# Patient Record
Sex: FEMALE | Race: WHITE | NOT HISPANIC OR LATINO | Employment: UNEMPLOYED | URBAN - METROPOLITAN AREA
[De-identification: names, ages, dates, MRNs, and addresses within clinical notes are randomized per-mention and may not be internally consistent; named-entity substitution may affect disease eponyms.]

---

## 2017-01-01 ENCOUNTER — APPOINTMENT (OUTPATIENT)
Dept: CARDIOLOGY | Facility: HOSPITAL | Age: 82
End: 2017-01-01
Attending: HOSPITALIST

## 2017-01-01 ENCOUNTER — APPOINTMENT (OUTPATIENT)
Dept: GENERAL RADIOLOGY | Facility: HOSPITAL | Age: 82
End: 2017-01-01

## 2017-01-01 ENCOUNTER — APPOINTMENT (OUTPATIENT)
Dept: CT IMAGING | Facility: HOSPITAL | Age: 82
End: 2017-01-01

## 2017-01-01 ENCOUNTER — HOSPITAL ENCOUNTER (INPATIENT)
Facility: HOSPITAL | Age: 82
LOS: 2 days | End: 2017-03-27
Attending: EMERGENCY MEDICINE | Admitting: HOSPITALIST

## 2017-01-01 VITALS
TEMPERATURE: 100.9 F | OXYGEN SATURATION: 96 % | HEART RATE: 55 BPM | DIASTOLIC BLOOD PRESSURE: 53 MMHG | BODY MASS INDEX: 21.27 KG/M2 | HEIGHT: 64 IN | RESPIRATION RATE: 28 BRPM | WEIGHT: 124.56 LBS | SYSTOLIC BLOOD PRESSURE: 103 MMHG

## 2017-01-01 DIAGNOSIS — R42 DIZZINESS: ICD-10-CM

## 2017-01-01 DIAGNOSIS — N17.9 ACUTE ON CHRONIC RENAL FAILURE (HCC): ICD-10-CM

## 2017-01-01 DIAGNOSIS — Z78.9 IMPAIRED MOBILITY AND ADLS: ICD-10-CM

## 2017-01-01 DIAGNOSIS — Z74.09 IMPAIRED FUNCTIONAL MOBILITY, BALANCE, GAIT, AND ENDURANCE: ICD-10-CM

## 2017-01-01 DIAGNOSIS — Z74.09 IMPAIRED MOBILITY AND ADLS: ICD-10-CM

## 2017-01-01 DIAGNOSIS — N18.9 ACUTE ON CHRONIC RENAL FAILURE (HCC): ICD-10-CM

## 2017-01-01 DIAGNOSIS — R53.1 WEAKNESS: ICD-10-CM

## 2017-01-01 DIAGNOSIS — E86.0 DEHYDRATION, MODERATE: Primary | ICD-10-CM

## 2017-01-01 LAB
ABO GROUP BLD: NORMAL
ABO GROUP BLD: NORMAL
ALBUMIN SERPL-MCNC: 3.1 G/DL (ref 3.2–4.8)
ALBUMIN SERPL-MCNC: 3.6 G/DL (ref 3.2–4.8)
ALBUMIN/GLOB SERPL: 1.5 G/DL (ref 1.5–2.5)
ALBUMIN/GLOB SERPL: 1.6 G/DL (ref 1.5–2.5)
ALP SERPL-CCNC: 155 U/L (ref 25–100)
ALP SERPL-CCNC: 162 U/L (ref 25–100)
ALT SERPL W P-5'-P-CCNC: 15 U/L (ref 7–40)
ALT SERPL W P-5'-P-CCNC: 18 U/L (ref 7–40)
AMORPH URATE CRY URNS QL MICRO: ABNORMAL /HPF
ANION GAP SERPL CALCULATED.3IONS-SCNC: 10 MMOL/L (ref 3–11)
ANION GAP SERPL CALCULATED.3IONS-SCNC: 10 MMOL/L (ref 3–11)
ANION GAP SERPL CALCULATED.3IONS-SCNC: 13 MMOL/L (ref 3–11)
ANION GAP SERPL CALCULATED.3IONS-SCNC: 16 MMOL/L (ref 3–11)
APTT PPP: 26.4 SECONDS (ref 24–31)
ARTERIAL PATENCY WRIST A: ABNORMAL
AST SERPL-CCNC: 106 U/L (ref 0–33)
AST SERPL-CCNC: 90 U/L (ref 0–33)
ATMOSPHERIC PRESS: ABNORMAL MMHG
BACTERIA SPEC AEROBE CULT: NORMAL
BACTERIA UR QL AUTO: ABNORMAL /HPF
BASE EXCESS BLDA CALC-SCNC: <-20 MMOL/L (ref 0–2)
BASOPHILS # BLD MANUAL: 0 10*3/MM3 (ref 0–0.2)
BASOPHILS # BLD MANUAL: 0.1 10*3/MM3 (ref 0–0.2)
BASOPHILS NFR BLD AUTO: 0 % (ref 0–1)
BASOPHILS NFR BLD AUTO: 1 % (ref 0–1)
BDY SITE: ABNORMAL
BH CV ECHO MEAS - AI DEC SLOPE: 226.6 CM/SEC^2
BH CV ECHO MEAS - AI MAX PG: 48.6 MMHG
BH CV ECHO MEAS - AI MAX VEL: 348.6 CM/SEC
BH CV ECHO MEAS - AI P1/2T: 450.7 MSEC
BH CV ECHO MEAS - AO MAX PG (FULL): 0.47 MMHG
BH CV ECHO MEAS - AO MAX PG: 7 MMHG
BH CV ECHO MEAS - AO ROOT AREA (BSA CORRECTED): 1.6
BH CV ECHO MEAS - AO ROOT AREA: 5.1 CM^2
BH CV ECHO MEAS - AO ROOT DIAM: 2.5 CM
BH CV ECHO MEAS - AO V2 MAX: 132.7 CM/SEC
BH CV ECHO MEAS - AVA(V,A): 2.7 CM^2
BH CV ECHO MEAS - AVA(V,D): 2.7 CM^2
BH CV ECHO MEAS - BSA(HAYCOCK): 1.6 M^2
BH CV ECHO MEAS - BSA: 1.6 M^2
BH CV ECHO MEAS - BZI_BMI: 21.3 KILOGRAMS/M^2
BH CV ECHO MEAS - BZI_METRIC_HEIGHT: 162.6 CM
BH CV ECHO MEAS - BZI_METRIC_WEIGHT: 56.2 KG
BH CV ECHO MEAS - EDV(CUBED): 31 ML
BH CV ECHO MEAS - EDV(TEICH): 39.1 ML
BH CV ECHO MEAS - EF(CUBED): 77.8 %
BH CV ECHO MEAS - EF(TEICH): 71.4 %
BH CV ECHO MEAS - ESV(CUBED): 6.9 ML
BH CV ECHO MEAS - ESV(TEICH): 11.2 ML
BH CV ECHO MEAS - FS: 39.4 %
BH CV ECHO MEAS - IVS/LVPW: 0.98
BH CV ECHO MEAS - IVSD: 0.98 CM
BH CV ECHO MEAS - LA DIMENSION: 3.4 CM
BH CV ECHO MEAS - LA/AO: 1.3
BH CV ECHO MEAS - LAT PEAK E' VEL: 11.5 CM/SEC
BH CV ECHO MEAS - LV MASS(C)D: 86.8 GRAMS
BH CV ECHO MEAS - LV MASS(C)DI: 54.4 GRAMS/M^2
BH CV ECHO MEAS - LV MAX PG: 6.6 MMHG
BH CV ECHO MEAS - LV MEAN PG: 3.4 MMHG
BH CV ECHO MEAS - LV V1 MAX: 128.2 CM/SEC
BH CV ECHO MEAS - LV V1 MEAN: 85.1 CM/SEC
BH CV ECHO MEAS - LV V1 VTI: 19.2 CM
BH CV ECHO MEAS - LVIDD: 3.1 CM
BH CV ECHO MEAS - LVIDS: 1.9 CM
BH CV ECHO MEAS - LVOT AREA (M): 2.8 CM^2
BH CV ECHO MEAS - LVOT AREA: 2.8 CM^2
BH CV ECHO MEAS - LVOT DIAM: 1.9 CM
BH CV ECHO MEAS - LVPWD: 1 CM
BH CV ECHO MEAS - MED PEAK E' VEL: 7.7 CM/SEC
BH CV ECHO MEAS - MV E MAX VEL: 146 CM/SEC
BH CV ECHO MEAS - PA ACC SLOPE: 965.8 CM/SEC^2
BH CV ECHO MEAS - PA ACC TIME: 0.1 SEC
BH CV ECHO MEAS - PA MAX PG: 4.7 MMHG
BH CV ECHO MEAS - PA PR(ACCEL): 33.8 MMHG
BH CV ECHO MEAS - PA V2 MAX: 108.6 CM/SEC
BH CV ECHO MEAS - RAP SYSTOLE: 8 MMHG
BH CV ECHO MEAS - RVDD: 2.1 CM
BH CV ECHO MEAS - RVSP: 52 MMHG
BH CV ECHO MEAS - SI(CUBED): 15.1 ML/M^2
BH CV ECHO MEAS - SI(LVOT): 34 ML/M^2
BH CV ECHO MEAS - SI(TEICH): 17.5 ML/M^2
BH CV ECHO MEAS - SV(CUBED): 24.1 ML
BH CV ECHO MEAS - SV(LVOT): 54.4 ML
BH CV ECHO MEAS - SV(TEICH): 27.9 ML
BH CV ECHO MEAS - TAPSE (>1.6): 1 CM2
BH CV ECHO MEAS - TR MAX VEL: 333 CM/SEC
BH CV LOWER VASCULAR LEFT COMMON FEMORAL AUGMENT: NORMAL
BH CV LOWER VASCULAR LEFT COMMON FEMORAL COMPRESS: NORMAL
BH CV LOWER VASCULAR LEFT COMMON FEMORAL PHASIC: NORMAL
BH CV LOWER VASCULAR LEFT COMMON FEMORAL SPONT: NORMAL
BH CV LOWER VASCULAR LEFT DISTAL FEMORAL COMPRESS: NORMAL
BH CV LOWER VASCULAR LEFT GASTRONEMIUS COMPRESS: NORMAL
BH CV LOWER VASCULAR LEFT GREATER SAPH AK COMPRESS: NORMAL
BH CV LOWER VASCULAR LEFT GREATER SAPH BK COMPRESS: NORMAL
BH CV LOWER VASCULAR LEFT LESSER SAPH COMPRESS: NORMAL
BH CV LOWER VASCULAR LEFT MID FEMORAL AUGMENT: NORMAL
BH CV LOWER VASCULAR LEFT MID FEMORAL COMPRESS: NORMAL
BH CV LOWER VASCULAR LEFT MID FEMORAL PHASIC: NORMAL
BH CV LOWER VASCULAR LEFT MID FEMORAL SPONT: NORMAL
BH CV LOWER VASCULAR LEFT PERONEAL COMPRESS: NORMAL
BH CV LOWER VASCULAR LEFT POPLITEAL AUGMENT: NORMAL
BH CV LOWER VASCULAR LEFT POPLITEAL COMPETENT: NORMAL
BH CV LOWER VASCULAR LEFT POPLITEAL COMPRESS: NORMAL
BH CV LOWER VASCULAR LEFT POPLITEAL PHASIC: NORMAL
BH CV LOWER VASCULAR LEFT POPLITEAL SPONT: NORMAL
BH CV LOWER VASCULAR LEFT POSTERIOR TIBIAL COMPRESS: NORMAL
BH CV LOWER VASCULAR LEFT PROXIMAL FEMORAL COMPRESS: NORMAL
BH CV LOWER VASCULAR LEFT SAPHENOFEMORAL JUNCTION AUGMENT: NORMAL
BH CV LOWER VASCULAR LEFT SAPHENOFEMORAL JUNCTION COMPRESS: NORMAL
BH CV LOWER VASCULAR LEFT SAPHENOFEMORAL JUNCTION PHASIC: NORMAL
BH CV LOWER VASCULAR LEFT SAPHENOFEMORAL JUNCTION SPONT: NORMAL
BH CV LOWER VASCULAR RIGHT COMMON FEMORAL AUGMENT: NORMAL
BH CV LOWER VASCULAR RIGHT COMMON FEMORAL COMPRESS: NORMAL
BH CV LOWER VASCULAR RIGHT COMMON FEMORAL PHASIC: NORMAL
BH CV LOWER VASCULAR RIGHT COMMON FEMORAL SPONT: NORMAL
BH CV UPPER VENOUS LEFT AXILLARY AUGMENT: NORMAL
BH CV UPPER VENOUS LEFT AXILLARY COMPRESS: NORMAL
BH CV UPPER VENOUS LEFT AXILLARY PHASIC: NORMAL
BH CV UPPER VENOUS LEFT AXILLARY SPONT: NORMAL
BH CV UPPER VENOUS LEFT BASILIC FOREARM COMPRESS: NORMAL
BH CV UPPER VENOUS LEFT BASILIC UPPER COMPRESS: NORMAL
BH CV UPPER VENOUS LEFT BRACHIAL COMPRESS: NORMAL
BH CV UPPER VENOUS LEFT CEPHALIC FOREARM COMPRESS: NORMAL
BH CV UPPER VENOUS LEFT CEPHALIC UPPER COMPRESS: NORMAL
BH CV UPPER VENOUS LEFT INTERNAL JUGULAR AUGMENT: NORMAL
BH CV UPPER VENOUS LEFT INTERNAL JUGULAR COMPRESS: NORMAL
BH CV UPPER VENOUS LEFT INTERNAL JUGULAR PHASIC: NORMAL
BH CV UPPER VENOUS LEFT INTERNAL JUGULAR SPONT: NORMAL
BH CV UPPER VENOUS LEFT SUBCLAVIAN AUGMENT: NORMAL
BH CV UPPER VENOUS LEFT SUBCLAVIAN COMPRESS: NORMAL
BH CV UPPER VENOUS LEFT SUBCLAVIAN PHASIC: NORMAL
BH CV UPPER VENOUS LEFT SUBCLAVIAN SPONT: NORMAL
BH CV UPPER VENOUS RIGHT SUBCLAVIAN AUGMENT: NORMAL
BH CV UPPER VENOUS RIGHT SUBCLAVIAN COMPRESS: NORMAL
BH CV UPPER VENOUS RIGHT SUBCLAVIAN PHASIC: NORMAL
BH CV UPPER VENOUS RIGHT SUBCLAVIAN SPONT: NORMAL
BH CV XLRA - RV BASE: 3.8 CM
BH CV XLRA - RV LENGTH: 5.1 CM
BH CV XLRA - RV MID: 2.8 CM
BH CV XLRA - TDI S': 12.1 CM/SEC
BILIRUB SERPL-MCNC: 0.5 MG/DL (ref 0.3–1.2)
BILIRUB SERPL-MCNC: 0.6 MG/DL (ref 0.3–1.2)
BILIRUB UR QL STRIP: NEGATIVE
BLASTS NFR BLD MANUAL: 4 % (ref 0–0)
BLD GP AB SCN SERPL QL: NEGATIVE
BNP SERPL-MCNC: 331 PG/ML (ref 0–100)
BUN BLD-MCNC: 62 MG/DL (ref 9–23)
BUN BLD-MCNC: 64 MG/DL (ref 9–23)
BUN BLD-MCNC: 72 MG/DL (ref 9–23)
BUN BLD-MCNC: 75 MG/DL (ref 9–23)
BUN/CREAT SERPL: 20.6 (ref 7–25)
BUN/CREAT SERPL: 20.7 (ref 7–25)
BUN/CREAT SERPL: 21.8 (ref 7–25)
BUN/CREAT SERPL: 24.2 (ref 7–25)
CALCIUM SPEC-SCNC: 7.7 MG/DL (ref 8.7–10.4)
CALCIUM SPEC-SCNC: 8 MG/DL (ref 8.7–10.4)
CALCIUM SPEC-SCNC: 8.2 MG/DL (ref 8.7–10.4)
CALCIUM SPEC-SCNC: 8.9 MG/DL (ref 8.7–10.4)
CHLORIDE SERPL-SCNC: 100 MMOL/L (ref 99–109)
CHLORIDE SERPL-SCNC: 100 MMOL/L (ref 99–109)
CHLORIDE SERPL-SCNC: 107 MMOL/L (ref 99–109)
CHLORIDE SERPL-SCNC: 97 MMOL/L (ref 99–109)
CLARITY UR: ABNORMAL
CO2 BLDA-SCNC: 7 MMOL/L (ref 22–33)
CO2 SERPL-SCNC: 16 MMOL/L (ref 20–31)
CO2 SERPL-SCNC: 17 MMOL/L (ref 20–31)
CO2 SERPL-SCNC: 22 MMOL/L (ref 20–31)
CO2 SERPL-SCNC: 25 MMOL/L (ref 20–31)
COHGB MFR BLD: 0.8 % (ref 0–2)
COLOR UR: YELLOW
CREAT BLD-MCNC: 3 MG/DL (ref 0.6–1.3)
CREAT BLD-MCNC: 3.1 MG/DL (ref 0.6–1.3)
CREAT BLD-MCNC: 3.1 MG/DL (ref 0.6–1.3)
CREAT BLD-MCNC: 3.3 MG/DL (ref 0.6–1.3)
CREAT UR-MCNC: 90.2 MG/DL
CYTOLOGIST CVX/VAG CYTO: NORMAL
D-LACTATE SERPL-SCNC: 11.2 MMOL/L (ref 0.5–2)
DEPRECATED RDW RBC AUTO: 46.6 FL (ref 37–54)
DEPRECATED RDW RBC AUTO: 47.5 FL (ref 37–54)
DEPRECATED RDW RBC AUTO: 48.5 FL (ref 37–54)
E/E' RATIO: 15
EOSINOPHIL # BLD MANUAL: 0.11 10*3/MM3 (ref 0.1–0.3)
EOSINOPHIL # BLD MANUAL: 0.21 10*3/MM3 (ref 0.1–0.3)
EOSINOPHIL NFR BLD MANUAL: 1 % (ref 0–3)
EOSINOPHIL NFR BLD MANUAL: 2 % (ref 0–3)
ERYTHROCYTE [DISTWIDTH] IN BLOOD BY AUTOMATED COUNT: 14.7 % (ref 11.3–14.5)
ERYTHROCYTE [DISTWIDTH] IN BLOOD BY AUTOMATED COUNT: 15.1 % (ref 11.3–14.5)
ERYTHROCYTE [DISTWIDTH] IN BLOOD BY AUTOMATED COUNT: 15.2 % (ref 11.3–14.5)
FERRITIN SERPL-MCNC: 1996 NG/ML (ref 10–291)
FLUAV AG NPH QL: NEGATIVE
FLUBV AG NPH QL IA: NEGATIVE
FOLATE SERPL-MCNC: 4.37 NG/ML (ref 3.2–20)
GFR SERPL CREATININE-BSD FRML MDRD: 13 ML/MIN/1.73
GFR SERPL CREATININE-BSD FRML MDRD: 14 ML/MIN/1.73
GFR SERPL CREATININE-BSD FRML MDRD: 14 ML/MIN/1.73
GFR SERPL CREATININE-BSD FRML MDRD: 15 ML/MIN/1.73
GGT SERPL-CCNC: 67 U/L (ref 0–37)
GLOBULIN UR ELPH-MCNC: 2.1 GM/DL
GLOBULIN UR ELPH-MCNC: 2.3 GM/DL
GLUCOSE BLD-MCNC: 85 MG/DL (ref 70–100)
GLUCOSE BLD-MCNC: 86 MG/DL (ref 70–100)
GLUCOSE BLD-MCNC: 86 MG/DL (ref 70–100)
GLUCOSE BLD-MCNC: 98 MG/DL (ref 70–100)
GLUCOSE UR STRIP-MCNC: NEGATIVE MG/DL
HCO3 BLDA-SCNC: 6.4 MMOL/L (ref 20–26)
HCT VFR BLD AUTO: 25 % (ref 34.5–44)
HCT VFR BLD AUTO: 26.9 % (ref 34.5–44)
HCT VFR BLD AUTO: 27.5 % (ref 34.5–44)
HCT VFR BLD CALC: 26.7 %
HGB BLD-MCNC: 8.5 G/DL (ref 11.5–15.5)
HGB BLD-MCNC: 8.9 G/DL (ref 11.5–15.5)
HGB BLD-MCNC: 9.6 G/DL (ref 11.5–15.5)
HGB BLDA-MCNC: 8.7 G/DL (ref 14–18)
HGB UR QL STRIP.AUTO: NEGATIVE
HOROWITZ INDEX BLD+IHG-RTO: 36 %
HYALINE CASTS UR QL AUTO: ABNORMAL /LPF
INR PPP: 1.1
IRON 24H UR-MRATE: 97 MCG/DL (ref 50–175)
IRON SATN MFR SERPL: 45 % (ref 15–50)
KETONES UR QL STRIP: NEGATIVE
LEFT ATRIUM VOLUME INDEX: 36 ML/M2
LEFT ATRIUM VOLUME: 58 CM3
LEUKOCYTE ESTERASE UR QL STRIP.AUTO: NEGATIVE
LIPASE SERPL-CCNC: 31 U/L (ref 6–51)
LV EF 2D ECHO EST: 80 %
LYMPHOCYTES # BLD MANUAL: 2.02 10*3/MM3 (ref 0.6–4.8)
LYMPHOCYTES # BLD MANUAL: 3.31 10*3/MM3 (ref 0.6–4.8)
LYMPHOCYTES NFR BLD MANUAL: 1 % (ref 0–12)
LYMPHOCYTES NFR BLD MANUAL: 11 % (ref 0–12)
LYMPHOCYTES NFR BLD MANUAL: 18 % (ref 24–44)
LYMPHOCYTES NFR BLD MANUAL: 32 % (ref 24–44)
MAGNESIUM SERPL-MCNC: 2.6 MG/DL (ref 1.3–2.7)
MAGNESIUM SERPL-MCNC: 2.6 MG/DL (ref 1.3–2.7)
MCH RBC QN AUTO: 28.8 PG (ref 27–31)
MCH RBC QN AUTO: 29.2 PG (ref 27–31)
MCH RBC QN AUTO: 30.1 PG (ref 27–31)
MCHC RBC AUTO-ENTMCNC: 33.1 G/DL (ref 32–36)
MCHC RBC AUTO-ENTMCNC: 34 G/DL (ref 32–36)
MCHC RBC AUTO-ENTMCNC: 34.9 G/DL (ref 32–36)
MCV RBC AUTO: 84.7 FL (ref 80–99)
MCV RBC AUTO: 86.2 FL (ref 80–99)
MCV RBC AUTO: 88.2 FL (ref 80–99)
METAMYELOCYTES NFR BLD MANUAL: 10 % (ref 0–0)
METAMYELOCYTES NFR BLD MANUAL: 2 % (ref 0–0)
METHGB BLD QL: 1.1 % (ref 0–1.5)
MODALITY: ABNORMAL
MONOCYTES # BLD AUTO: 0.11 10*3/MM3 (ref 0–1)
MONOCYTES # BLD AUTO: 1.14 10*3/MM3 (ref 0–1)
MYELOCYTES NFR BLD MANUAL: 25 % (ref 0–0)
NEUTROPHILS # BLD AUTO: 3.41 10*3/MM3 (ref 1.5–8.3)
NEUTROPHILS # BLD AUTO: 4.26 10*3/MM3 (ref 1.5–8.3)
NEUTROPHILS NFR BLD MANUAL: 16 % (ref 41–71)
NEUTROPHILS NFR BLD MANUAL: 31 % (ref 41–71)
NEUTS BAND NFR BLD MANUAL: 17 % (ref 0–5)
NEUTS BAND NFR BLD MANUAL: 7 % (ref 0–5)
NITRITE UR QL STRIP: NEGATIVE
OXYHGB MFR BLDV: 86.1 % (ref 94–99)
PATH INTERP BLD-IMP: NORMAL
PCO2 BLDA: 19.3 MM HG (ref 35–45)
PH BLDA: 7.13 PH UNITS (ref 7.35–7.45)
PH UR STRIP.AUTO: 5.5 [PH] (ref 5–8)
PHOSPHATE SERPL-MCNC: 2.6 MG/DL (ref 2.4–5.1)
PHOSPHATE SERPL-MCNC: 3.3 MG/DL (ref 2.4–5.1)
PLAT MORPH BLD: NORMAL
PLAT MORPH BLD: NORMAL
PLATELET # BLD AUTO: 55 10*3/MM3 (ref 150–450)
PLATELET # BLD AUTO: 70 10*3/MM3 (ref 150–450)
PLATELET # BLD AUTO: 71 10*3/MM3 (ref 150–450)
PMV BLD AUTO: 8.9 FL (ref 6–12)
PMV BLD AUTO: 8.9 FL (ref 6–12)
PMV BLD AUTO: 9.2 FL (ref 6–12)
PO2 BLDA: 66.6 MM HG (ref 83–108)
POTASSIUM BLD-SCNC: 5 MMOL/L (ref 3.5–5.5)
POTASSIUM BLD-SCNC: 5 MMOL/L (ref 3.5–5.5)
POTASSIUM BLD-SCNC: 5.1 MMOL/L (ref 3.5–5.5)
POTASSIUM BLD-SCNC: 6 MMOL/L (ref 3.5–5.5)
PROT SERPL-MCNC: 5.2 G/DL (ref 5.7–8.2)
PROT SERPL-MCNC: 5.9 G/DL (ref 5.7–8.2)
PROT UR QL STRIP: ABNORMAL
PROTHROMBIN TIME: 12 SECONDS (ref 9.6–11.5)
RBC # BLD AUTO: 2.95 10*6/MM3 (ref 3.89–5.14)
RBC # BLD AUTO: 3.05 10*6/MM3 (ref 3.89–5.14)
RBC # BLD AUTO: 3.19 10*6/MM3 (ref 3.89–5.14)
RBC # UR: ABNORMAL /HPF
RBC MORPH BLD: NORMAL
RBC MORPH BLD: NORMAL
REF LAB TEST METHOD: ABNORMAL
RETICS/RBC NFR AUTO: 2.43 % (ref 0.5–1.5)
RH BLD: NEGATIVE
RH BLD: NEGATIVE
S PYO AG THROAT QL: NEGATIVE
SCAN SLIDE: NORMAL
SODIUM BLD-SCNC: 132 MMOL/L (ref 132–146)
SODIUM BLD-SCNC: 132 MMOL/L (ref 132–146)
SODIUM BLD-SCNC: 133 MMOL/L (ref 132–146)
SODIUM BLD-SCNC: 136 MMOL/L (ref 132–146)
SODIUM UR-SCNC: 18 MMOL/L (ref 30–90)
SP GR UR STRIP: 1.01 (ref 1–1.03)
SQUAMOUS #/AREA URNS HPF: ABNORMAL /HPF
TIBC SERPL-MCNC: 216 MCG/DL (ref 250–450)
TROPONIN I SERPL-MCNC: 0.01 NG/ML
TROPONIN I SERPL-MCNC: 0.02 NG/ML
TROPONIN I SERPL-MCNC: 0.02 NG/ML (ref 0–0.07)
TROPONIN I SERPL-MCNC: 0.03 NG/ML
TSH SERPL DL<=0.05 MIU/L-ACNC: 3.24 MIU/ML (ref 0.35–5.35)
TSH SERPL DL<=0.05 MIU/L-ACNC: 3.68 MIU/ML (ref 0.35–5.35)
UROBILINOGEN UR QL STRIP: ABNORMAL
VARIANT LYMPHS NFR BLD MANUAL: 15 % (ref 0–5)
VARIANT LYMPHS NFR BLD MANUAL: 7 % (ref 0–5)
VIT B12 BLD-MCNC: 273 PG/ML (ref 211–911)
WBC MORPH BLD: NORMAL
WBC MORPH BLD: NORMAL
WBC NRBC COR # BLD: 10.33 10*3/MM3 (ref 3.5–10.8)
WBC NRBC COR # BLD: 11.22 10*3/MM3 (ref 3.5–10.8)
WBC NRBC COR # BLD: 14.59 10*3/MM3 (ref 3.5–10.8)
WBC UR QL AUTO: ABNORMAL /HPF

## 2017-01-01 PROCEDURE — 85045 AUTOMATED RETICULOCYTE COUNT: CPT | Performed by: INTERNAL MEDICINE

## 2017-01-01 PROCEDURE — 82728 ASSAY OF FERRITIN: CPT | Performed by: INTERNAL MEDICINE

## 2017-01-01 PROCEDURE — 83605 ASSAY OF LACTIC ACID: CPT | Performed by: INTERNAL MEDICINE

## 2017-01-01 PROCEDURE — 97166 OT EVAL MOD COMPLEX 45 MIN: CPT

## 2017-01-01 PROCEDURE — 84484 ASSAY OF TROPONIN QUANT: CPT | Performed by: HOSPITALIST

## 2017-01-01 PROCEDURE — 82607 VITAMIN B-12: CPT | Performed by: INTERNAL MEDICINE

## 2017-01-01 PROCEDURE — 84100 ASSAY OF PHOSPHORUS: CPT | Performed by: INTERNAL MEDICINE

## 2017-01-01 PROCEDURE — 93306 TTE W/DOPPLER COMPLETE: CPT

## 2017-01-01 PROCEDURE — 84100 ASSAY OF PHOSPHORUS: CPT | Performed by: HOSPITALIST

## 2017-01-01 PROCEDURE — 85027 COMPLETE CBC AUTOMATED: CPT | Performed by: HOSPITALIST

## 2017-01-01 PROCEDURE — 70450 CT HEAD/BRAIN W/O DYE: CPT

## 2017-01-01 PROCEDURE — 83550 IRON BINDING TEST: CPT | Performed by: INTERNAL MEDICINE

## 2017-01-01 PROCEDURE — 87804 INFLUENZA ASSAY W/OPTIC: CPT | Performed by: INTERNAL MEDICINE

## 2017-01-01 PROCEDURE — 85025 COMPLETE CBC W/AUTO DIFF WBC: CPT | Performed by: NURSE PRACTITIONER

## 2017-01-01 PROCEDURE — 86901 BLOOD TYPING SEROLOGIC RH(D): CPT | Performed by: INTERNAL MEDICINE

## 2017-01-01 PROCEDURE — 80048 BASIC METABOLIC PNL TOTAL CA: CPT | Performed by: HOSPITALIST

## 2017-01-01 PROCEDURE — 82746 ASSAY OF FOLIC ACID SERUM: CPT | Performed by: INTERNAL MEDICINE

## 2017-01-01 PROCEDURE — 86850 RBC ANTIBODY SCREEN: CPT | Performed by: INTERNAL MEDICINE

## 2017-01-01 PROCEDURE — 92610 EVALUATE SWALLOWING FUNCTION: CPT

## 2017-01-01 PROCEDURE — 84300 ASSAY OF URINE SODIUM: CPT | Performed by: INTERNAL MEDICINE

## 2017-01-01 PROCEDURE — 99239 HOSP IP/OBS DSCHRG MGMT >30: CPT | Performed by: HOSPITALIST

## 2017-01-01 PROCEDURE — 36600 WITHDRAWAL OF ARTERIAL BLOOD: CPT | Performed by: INTERNAL MEDICINE

## 2017-01-01 PROCEDURE — 93005 ELECTROCARDIOGRAM TRACING: CPT | Performed by: HOSPITALIST

## 2017-01-01 PROCEDURE — 71010 HC CHEST PA OR AP: CPT

## 2017-01-01 PROCEDURE — 86900 BLOOD TYPING SEROLOGIC ABO: CPT

## 2017-01-01 PROCEDURE — 83540 ASSAY OF IRON: CPT | Performed by: INTERNAL MEDICINE

## 2017-01-01 PROCEDURE — 82570 ASSAY OF URINE CREATININE: CPT | Performed by: INTERNAL MEDICINE

## 2017-01-01 PROCEDURE — 82977 ASSAY OF GGT: CPT | Performed by: INTERNAL MEDICINE

## 2017-01-01 PROCEDURE — 80053 COMPREHEN METABOLIC PANEL: CPT | Performed by: NURSE PRACTITIONER

## 2017-01-01 PROCEDURE — 93010 ELECTROCARDIOGRAM REPORT: CPT | Performed by: INTERNAL MEDICINE

## 2017-01-01 PROCEDURE — 84484 ASSAY OF TROPONIN QUANT: CPT

## 2017-01-01 PROCEDURE — 99233 SBSQ HOSP IP/OBS HIGH 50: CPT | Performed by: HOSPITALIST

## 2017-01-01 PROCEDURE — 87081 CULTURE SCREEN ONLY: CPT | Performed by: NURSE PRACTITIONER

## 2017-01-01 PROCEDURE — 83735 ASSAY OF MAGNESIUM: CPT | Performed by: HOSPITALIST

## 2017-01-01 PROCEDURE — 87880 STREP A ASSAY W/OPTIC: CPT | Performed by: NURSE PRACTITIONER

## 2017-01-01 PROCEDURE — 82805 BLOOD GASES W/O2 SATURATION: CPT | Performed by: INTERNAL MEDICINE

## 2017-01-01 PROCEDURE — 25010000002 VANCOMYCIN PER 500 MG: Performed by: NURSE PRACTITIONER

## 2017-01-01 PROCEDURE — 85025 COMPLETE CBC W/AUTO DIFF WBC: CPT | Performed by: INTERNAL MEDICINE

## 2017-01-01 PROCEDURE — 85007 BL SMEAR W/DIFF WBC COUNT: CPT | Performed by: INTERNAL MEDICINE

## 2017-01-01 PROCEDURE — 84443 ASSAY THYROID STIM HORMONE: CPT | Performed by: INTERNAL MEDICINE

## 2017-01-01 PROCEDURE — 99284 EMERGENCY DEPT VISIT MOD MDM: CPT

## 2017-01-01 PROCEDURE — 84484 ASSAY OF TROPONIN QUANT: CPT | Performed by: INTERNAL MEDICINE

## 2017-01-01 PROCEDURE — 25010000002 FUROSEMIDE PER 20 MG: Performed by: INTERNAL MEDICINE

## 2017-01-01 PROCEDURE — 86900 BLOOD TYPING SEROLOGIC ABO: CPT | Performed by: INTERNAL MEDICINE

## 2017-01-01 PROCEDURE — 85610 PROTHROMBIN TIME: CPT | Performed by: INTERNAL MEDICINE

## 2017-01-01 PROCEDURE — 83690 ASSAY OF LIPASE: CPT | Performed by: NURSE PRACTITIONER

## 2017-01-01 PROCEDURE — 25010000002 ONDANSETRON PER 1 MG: Performed by: INTERNAL MEDICINE

## 2017-01-01 PROCEDURE — 85060 BLOOD SMEAR INTERPRETATION: CPT | Performed by: INTERNAL MEDICINE

## 2017-01-01 PROCEDURE — 87040 BLOOD CULTURE FOR BACTERIA: CPT | Performed by: NURSE PRACTITIONER

## 2017-01-01 PROCEDURE — 93306 TTE W/DOPPLER COMPLETE: CPT | Performed by: INTERNAL MEDICINE

## 2017-01-01 PROCEDURE — 83880 ASSAY OF NATRIURETIC PEPTIDE: CPT | Performed by: NURSE PRACTITIONER

## 2017-01-01 PROCEDURE — 97163 PT EVAL HIGH COMPLEX 45 MIN: CPT

## 2017-01-01 PROCEDURE — 25010000003 CEFTRIAXONE PER 250 MG: Performed by: NURSE PRACTITIONER

## 2017-01-01 PROCEDURE — 85730 THROMBOPLASTIN TIME PARTIAL: CPT | Performed by: INTERNAL MEDICINE

## 2017-01-01 PROCEDURE — 99223 1ST HOSP IP/OBS HIGH 75: CPT | Performed by: INTERNAL MEDICINE

## 2017-01-01 PROCEDURE — 93971 EXTREMITY STUDY: CPT

## 2017-01-01 PROCEDURE — 93005 ELECTROCARDIOGRAM TRACING: CPT | Performed by: INTERNAL MEDICINE

## 2017-01-01 PROCEDURE — 81001 URINALYSIS AUTO W/SCOPE: CPT | Performed by: NURSE PRACTITIONER

## 2017-01-01 PROCEDURE — 85007 BL SMEAR W/DIFF WBC COUNT: CPT | Performed by: NURSE PRACTITIONER

## 2017-01-01 PROCEDURE — 71101 X-RAY EXAM UNILAT RIBS/CHEST: CPT

## 2017-01-01 PROCEDURE — 86901 BLOOD TYPING SEROLOGIC RH(D): CPT

## 2017-01-01 PROCEDURE — 84443 ASSAY THYROID STIM HORMONE: CPT | Performed by: NURSE PRACTITIONER

## 2017-01-01 PROCEDURE — 80053 COMPREHEN METABOLIC PANEL: CPT | Performed by: INTERNAL MEDICINE

## 2017-01-01 PROCEDURE — 74176 CT ABD & PELVIS W/O CONTRAST: CPT

## 2017-01-01 RX ORDER — PANTOPRAZOLE SODIUM 40 MG/1
40 TABLET, DELAYED RELEASE ORAL DAILY
Status: DISCONTINUED | OUTPATIENT
Start: 2017-01-01 | End: 2017-03-28 | Stop reason: HOSPADM

## 2017-01-01 RX ORDER — MELATONIN
2000 DAILY
COMMUNITY

## 2017-01-01 RX ORDER — FUROSEMIDE 10 MG/ML
20 INJECTION INTRAMUSCULAR; INTRAVENOUS ONCE
Status: COMPLETED | OUTPATIENT
Start: 2017-01-01 | End: 2017-01-01

## 2017-01-01 RX ORDER — GABAPENTIN 300 MG/1
300 CAPSULE ORAL EVERY 8 HOURS SCHEDULED
Status: DISCONTINUED | OUTPATIENT
Start: 2017-01-01 | End: 2017-01-01

## 2017-01-01 RX ORDER — ONDANSETRON 4 MG/1
4 TABLET, FILM COATED ORAL EVERY 6 HOURS PRN
Status: DISCONTINUED | OUTPATIENT
Start: 2017-01-01 | End: 2017-03-28 | Stop reason: HOSPADM

## 2017-01-01 RX ORDER — ACETAMINOPHEN 325 MG/1
650 TABLET ORAL EVERY 6 HOURS PRN
Status: DISCONTINUED | OUTPATIENT
Start: 2017-01-01 | End: 2017-03-28 | Stop reason: HOSPADM

## 2017-01-01 RX ORDER — SODIUM CHLORIDE 0.9 % (FLUSH) 0.9 %
1-10 SYRINGE (ML) INJECTION AS NEEDED
Status: DISCONTINUED | OUTPATIENT
Start: 2017-01-01 | End: 2017-03-28 | Stop reason: HOSPADM

## 2017-01-01 RX ORDER — SODIUM CHLORIDE 0.9 % (FLUSH) 0.9 %
10 SYRINGE (ML) INJECTION AS NEEDED
Status: DISCONTINUED | OUTPATIENT
Start: 2017-01-01 | End: 2017-03-28 | Stop reason: HOSPADM

## 2017-01-01 RX ORDER — DILTIAZEM HCL/D5W 125 MG/125
5-15 PLASTIC BAG, INJECTION (ML) INTRAVENOUS
Status: DISCONTINUED | OUTPATIENT
Start: 2017-01-01 | End: 2017-03-28 | Stop reason: HOSPADM

## 2017-01-01 RX ORDER — DOCUSATE SODIUM 100 MG/1
100 CAPSULE, LIQUID FILLED ORAL 2 TIMES DAILY
Status: DISCONTINUED | OUTPATIENT
Start: 2017-01-01 | End: 2017-03-28 | Stop reason: HOSPADM

## 2017-01-01 RX ORDER — ASPIRIN 81 MG/1
81 TABLET, CHEWABLE ORAL DAILY
Status: DISCONTINUED | OUTPATIENT
Start: 2017-01-01 | End: 2017-01-01

## 2017-01-01 RX ORDER — ANASTROZOLE 1 MG/1
TABLET ORAL DAILY
COMMUNITY

## 2017-01-01 RX ORDER — SACCHAROMYCES BOULARDII 250 MG
250 CAPSULE ORAL DAILY
COMMUNITY

## 2017-01-01 RX ORDER — GABAPENTIN 600 MG/1
600 TABLET ORAL 3 TIMES DAILY
COMMUNITY

## 2017-01-01 RX ORDER — FAMOTIDINE 20 MG/1
20 TABLET, FILM COATED ORAL 2 TIMES DAILY
Status: DISCONTINUED | OUTPATIENT
Start: 2017-01-01 | End: 2017-01-01

## 2017-01-01 RX ORDER — SACCHAROMYCES BOULARDII 250 MG
250 CAPSULE ORAL DAILY
Status: DISCONTINUED | OUTPATIENT
Start: 2017-01-01 | End: 2017-03-28 | Stop reason: HOSPADM

## 2017-01-01 RX ORDER — LISINOPRIL 20 MG/1
TABLET ORAL DAILY
COMMUNITY

## 2017-01-01 RX ORDER — DULOXETIN HYDROCHLORIDE 30 MG/1
30 CAPSULE, DELAYED RELEASE ORAL DAILY
COMMUNITY

## 2017-01-01 RX ORDER — CEFTRIAXONE SODIUM 1 G/50ML
1 INJECTION, SOLUTION INTRAVENOUS EVERY 24 HOURS
Status: DISCONTINUED | OUTPATIENT
Start: 2017-01-01 | End: 2017-01-01

## 2017-01-01 RX ORDER — SODIUM CHLORIDE 9 MG/ML
125 INJECTION, SOLUTION INTRAVENOUS CONTINUOUS
Status: ACTIVE | OUTPATIENT
Start: 2017-01-01 | End: 2017-01-01

## 2017-01-01 RX ORDER — MELATONIN
2000 DAILY
Status: DISCONTINUED | OUTPATIENT
Start: 2017-01-01 | End: 2017-03-28 | Stop reason: HOSPADM

## 2017-01-01 RX ORDER — CHLORAL HYDRATE 500 MG
1200 CAPSULE ORAL
COMMUNITY

## 2017-01-01 RX ORDER — VANCOMYCIN HYDROCHLORIDE 1 G/200ML
1000 INJECTION, SOLUTION INTRAVENOUS ONCE
Status: COMPLETED | OUTPATIENT
Start: 2017-01-01 | End: 2017-01-01

## 2017-01-01 RX ORDER — SODIUM CHLORIDE 9 MG/ML
125 INJECTION, SOLUTION INTRAVENOUS CONTINUOUS
Status: DISCONTINUED | OUTPATIENT
Start: 2017-01-01 | End: 2017-03-28 | Stop reason: HOSPADM

## 2017-01-01 RX ORDER — NITROGLYCERIN 0.4 MG/1
0.4 TABLET SUBLINGUAL
Status: DISCONTINUED | OUTPATIENT
Start: 2017-01-01 | End: 2017-03-28 | Stop reason: HOSPADM

## 2017-01-01 RX ORDER — ONDANSETRON 2 MG/ML
4 INJECTION INTRAMUSCULAR; INTRAVENOUS EVERY 6 HOURS PRN
Status: DISCONTINUED | OUTPATIENT
Start: 2017-01-01 | End: 2017-03-28 | Stop reason: HOSPADM

## 2017-01-01 RX ORDER — NITROGLYCERIN 0.4 MG/1
TABLET SUBLINGUAL
Status: COMPLETED
Start: 2017-01-01 | End: 2017-01-01

## 2017-01-01 RX ORDER — PANTOPRAZOLE SODIUM 20 MG/1
20 TABLET, DELAYED RELEASE ORAL DAILY
COMMUNITY

## 2017-01-01 RX ADMIN — CEFTRIAXONE SODIUM 1 G: 1 INJECTION, SOLUTION INTRAVENOUS at 01:24

## 2017-01-01 RX ADMIN — Medication 250 MG: at 09:10

## 2017-01-01 RX ADMIN — GABAPENTIN 300 MG: 300 CAPSULE ORAL at 00:32

## 2017-01-01 RX ADMIN — Medication 250 MG: at 08:11

## 2017-01-01 RX ADMIN — VANCOMYCIN HYDROCHLORIDE 1000 MG: 1 INJECTION, SOLUTION INTRAVENOUS at 01:24

## 2017-01-01 RX ADMIN — Medication 15 MG/HR: at 15:46

## 2017-01-01 RX ADMIN — PANTOPRAZOLE SODIUM 40 MG: 40 TABLET, DELAYED RELEASE ORAL at 09:10

## 2017-01-01 RX ADMIN — DOCUSATE SODIUM 100 MG: 100 CAPSULE, LIQUID FILLED ORAL at 09:10

## 2017-01-01 RX ADMIN — ACETAMINOPHEN 650 MG: 325 TABLET, FILM COATED ORAL at 19:53

## 2017-01-01 RX ADMIN — ACETAMINOPHEN 650 MG: 325 TABLET, FILM COATED ORAL at 06:23

## 2017-01-01 RX ADMIN — SODIUM BICARBONATE 75 ML/HR: 84 INJECTION, SOLUTION INTRAVENOUS at 19:43

## 2017-01-01 RX ADMIN — PANTOPRAZOLE SODIUM 40 MG: 40 TABLET, DELAYED RELEASE ORAL at 08:11

## 2017-01-01 RX ADMIN — NITROGLYCERIN: 0.4 TABLET SUBLINGUAL at 09:22

## 2017-01-01 RX ADMIN — ONDANSETRON 4 MG: 2 INJECTION INTRAMUSCULAR; INTRAVENOUS at 18:02

## 2017-01-01 RX ADMIN — ACETAMINOPHEN 650 MG: 325 TABLET, FILM COATED ORAL at 18:47

## 2017-01-01 RX ADMIN — VITAMIN D, TAB 1000IU (100/BT) 2000 UNITS: 25 TAB at 08:11

## 2017-01-01 RX ADMIN — DOCUSATE SODIUM 100 MG: 100 CAPSULE, LIQUID FILLED ORAL at 08:11

## 2017-01-01 RX ADMIN — GABAPENTIN 300 MG: 300 CAPSULE ORAL at 05:34

## 2017-01-01 RX ADMIN — Medication 15 MG/HR: at 10:30

## 2017-01-01 RX ADMIN — ACETAMINOPHEN 650 MG: 325 TABLET, FILM COATED ORAL at 05:03

## 2017-01-01 RX ADMIN — SODIUM CHLORIDE 1000 ML: 9 INJECTION, SOLUTION INTRAVENOUS at 20:40

## 2017-01-01 RX ADMIN — FUROSEMIDE 20 MG: 10 INJECTION, SOLUTION INTRAMUSCULAR; INTRAVENOUS at 18:53

## 2017-01-01 RX ADMIN — VITAMIN D, TAB 1000IU (100/BT) 2000 UNITS: 25 TAB at 09:10

## 2017-01-01 RX ADMIN — SODIUM CHLORIDE 125 ML/HR: 9 INJECTION, SOLUTION INTRAVENOUS at 23:30

## 2017-01-01 RX ADMIN — SODIUM CHLORIDE 125 ML/HR: 9 INJECTION, SOLUTION INTRAVENOUS at 06:31

## 2017-01-01 RX ADMIN — DOCUSATE SODIUM 100 MG: 100 CAPSULE, LIQUID FILLED ORAL at 17:42

## 2017-03-25 PROBLEM — N18.9 ACUTE ON CHRONIC RENAL FAILURE (HCC): Status: ACTIVE | Noted: 2017-01-01

## 2017-03-25 PROBLEM — N17.9 ACUTE ON CHRONIC RENAL FAILURE (HCC): Status: ACTIVE | Noted: 2017-01-01

## 2017-03-25 PROBLEM — C50.919 BREAST CANCER (HCC): Status: ACTIVE | Noted: 2017-01-01

## 2017-03-25 PROBLEM — C85.90 NON HODGKIN'S LYMPHOMA (HCC): Status: ACTIVE | Noted: 2017-01-01

## 2017-03-26 PROBLEM — E87.5 HYPERKALEMIA: Status: ACTIVE | Noted: 2017-01-01

## 2017-03-26 PROBLEM — G93.41 ENCEPHALOPATHY, METABOLIC: Status: ACTIVE | Noted: 2017-01-01

## 2017-03-26 PROBLEM — D69.6 THROMBOCYTOPENIA (HCC): Status: ACTIVE | Noted: 2017-01-01

## 2017-03-26 PROBLEM — R53.1 GENERALIZED WEAKNESS: Status: ACTIVE | Noted: 2017-01-01

## 2017-03-26 PROBLEM — D64.9 ANEMIA: Status: ACTIVE | Noted: 2017-01-01

## 2017-03-26 NOTE — PLAN OF CARE
Problem: Patient Care Overview (Adult)  Goal: Plan of Care Review  Outcome: Ongoing (interventions implemented as appropriate)    03/26/17 1446   Coping/Psychosocial Response Interventions   Plan Of Care Reviewed With patient;son   Outcome Evaluation   Outcome Summary/Follow up Plan pt very sleepy, meds adjusted with son made aware toileting with assist of staff

## 2017-03-26 NOTE — H&P
[facility] History and Physical    Primary Care Physician: Asia Masters MD/oncologist is Dr. Caballero    Chief complaint   Generalized weakness.  Moderate historian history obtained from the son.      History of Present Illness  87-year-old female who gets her care at [facility].  Admitted today here with the complaint of generalized weakness overall decline since last 2-3 weeks.  As per the son who is by the bedside states that she has been lethargic she stays in the bed she cannot bear weight on her legs she has few falls over last few days.  Patient herself does not complain of any associated dizziness lightheadedness.  The also noted she does have episodes of mild confusion in the form of she is forgetting simple facts.  No focal weakness was noted, family states that her speech has been slower and occasionally it is dysarthric but currently it's normal.  Patient does complain of sore throat for last few days, no associated fever or chills.  Does complain of some swallowing difficulty.  She also states that her urine output has decreased.  Her bowel movements are regular, she does not complain of any overt abdominal pain.    She is also under GI workup with Dr. Arnold/Dr. Yu, with abnormal area of inflammation in her ileum.  It appears that she had push enteroscopy, endoscopy, colonoscopy done over last few months with no positive finding except as above.  She also has a pill camera currently in her intestine.  On ER workup she was found to be having renal failure we do not know her baseline, she clinically appeared dehydrated.  Her CT head was negative.    ROS  Review of Systems   Is limited since patient does not contribute much to the history.    Otherwise complete ROS is negative except as mentioned in the HPI.    Past Medical History:  Active Problems:    Acute renal failure    Breast cancer    Non Hodgkin's lymphoma       Past Medical History:   Diagnosis Date   • Breast cancer-diagnosed 20  "years back with 3 currents approximately year back, with bony metastases, radiation to her spine approximately year back.    Hypertension  Peripheral neuropathy  GERD        • Non Hodgkin's lymphoma-no further intervention done.          Past Surgical History:  Past Surgical History:   Procedure Laterality Date   • APPENDECTOMY     • HYSTERECTOMY     • MASTECTOMY         Family History:   family history is not on file.    Social History:    reports that she has quit smoking. She does not have any smokeless tobacco history on file. She reports that she does not drink alcohol or use illicit drugs.    Medications:  Prescriptions Prior to Admission   Medication Sig Dispense Refill Last Dose   • anastrozole (ARIMIDEX) 1 MG tablet Take  by mouth Daily.      • cholecalciferol (VITAMIN D3) 1000 UNITS tablet Take 2,000 Units by mouth Daily.      • DULoxetine (CYMBALTA) 30 MG capsule Take 30 mg by mouth Daily.      • gabapentin (NEURONTIN) 600 MG tablet Take 600 mg by mouth 3 (Three) Times a Day.      • lisinopril (PRINIVIL,ZESTRIL) 20 MG tablet Take  by mouth Daily.      • Omega-3 Fatty Acids (FISH OIL) 1000 MG capsule capsule Take 1,200 mg by mouth Daily With Breakfast.      • pantoprazole (PROTONIX) 20 MG EC tablet Take 20 mg by mouth Daily.      • saccharomyces boulardii (FLORASTOR) 250 MG capsule Take 250 mg by mouth Daily.        Allergies   Allergen Reactions   • Penicillins            Physical Exam:   Temp:  [98.9 °F (37.2 °C)] 98.9 °F (37.2 °C)  Heart Rate:  [90] 90  Resp:  [16] 16  BP: (123-125)/(54-59) 123/54  /54  Pulse 90  Temp 98.9 °F (37.2 °C) (Oral)   Resp 16  Ht 64\" (162.6 cm)  Wt 109 lb (49.4 kg)  SpO2 95%  BMI 18.71 kg/m2  VITALS-   AS ABOVE.  GENERAL- Not distressed, well nourished.  RS- CTA-BL, ,  No wheezing , no crackles, good effort.  CVS- s1s2 Regular, mitral murmur.  ABD- soft, tender in epigastric area, some tenderness in right lower quadrant just above the idea being it's been there " since her fall, not distended, no organomegaly. BS good.  EXT- mild edema. Pulses + .  NEURO- AAO-3, power 4 /5 in all ext, no gross sensory deficit, cranial nerves intact.,  Speech clear but slow, she has a hard time recalling simple events.  EYES- Conjunctivae are normal. Pupils are equal, round, and reactive to light. No scleral icterus.   ENT- no external ear nose lesions, mucosa dry..  NECK-  No tracheal deviation present. No thyromegaly present, left cervical lymphadenopathy, left axilla palpable nodules questionable postop changes.  JOINTS/MSK- no deformity, no swelling.  SKIN- no rash , warm to touch.  PSYCHIATRIC-  has a normal mood and affect.Thought content normal.           Results Reviewed:  I have personally reviewed current lab, radiology, and data and agree.  Lab Results   Component Value Date    GLUCOSE 86 03/25/2017    BUN 72 (H) 03/25/2017    CREATININE 3.30 (H) 03/25/2017    EGFRIFNONA 13 (L) 03/25/2017    BCR 21.8 03/25/2017    CO2 25.0 03/25/2017    CALCIUM 8.9 03/25/2017    ALBUMIN 3.60 03/25/2017    LABIL2 1.6 03/25/2017     (H) 03/25/2017    ALT 18 03/25/2017     Lab Results   Component Value Date    WBC 11.22 (H) 03/25/2017    HGB 9.6 (L) 03/25/2017    HCT 27.5 (L) 03/25/2017    MCV 86.2 03/25/2017    PLT 71 (L) 03/25/2017     No results found for: CKTOTAL, CKMB, CKMBINDEX, TROPONINI, TROPONINT    Imaging Results (last 24 hours)     Procedure Component Value Units Date/Time    XR Ribs Right With PA Chest [[id number]] Updated:  03/25/17 2021    CT Head Without Contrast [[id number]]  (Abnormal) Collected:  03/25/17 2000     Updated:  03/25/17 2125    Narrative:         EXAM:    CT Head Without Intravenous Contrast.    CLINICAL HISTORY:    87 years old, female; Signs and symptoms; Altered mental status/memory loss;   Additional info: Change in mentation/ho cancer    TECHNIQUE:    Axial computed tomography images of the head/brain without intravenous   contrast.  This CT exam was performed  using one or more of the following dose   reduction techniques:  automated exposure control, adjustment of the mA and/or   kV according to patient size, and/or use of iterative reconstruction technique.    COMPARISON:    No relevant prior studies available.    FINDINGS:    Brain:  Generalized cerebral involutional change, age-appropriate.    Subcortical and periventricular white matter hypodensities.  No hemorrhage.  No   mass or midline shift.    Ventricles:  Ventricular system demonstrates moderate diffuse compensatory   enlargement.    Bones/joints:  Unremarkable.  No acute fracture.    Soft tissues:  Unremarkable.    Sinuses:  Unremarkable as visualized.  No acute sinusitis.    Mastoid air cells:  Unremarkable as visualized.  No mastoid effusion.      Impression:       1.  No acute abnormality.  2.  Generalized cerebral involutional change, age-appropriate.    3.  Chronic microvascular ischemic disease.    THIS DOCUMENT HAS BEEN ELECTRONICALLY SIGNED BY CHRIST DAS MD              OLD RECORDS REVIEW BY ME.      Assessment/Plan   Generalized weakness/mild confusion  -Likely metabolic encephalopathy secondary to dehydration.  -PT OT consult, plus minus neurologic patient does not do better by morning.  -?medications-arimedex, cymbalta    Acute renal failure-prerenal versus medications, clinically dehydrated.  -Hold her lisinopril, she recently got so matter, urine studies, monitor for urine output,    Hyperkalemia  -Recheck stat BMP if it's still up we'll go head and treat her with protocol.  -EKG no changes    Abnormal LFTs  -Recent PET scan on 3:15 do not show any abnormality on the liver, patient has no abdominal pain in that area will continue to monitor.    Anemia normocytic/thrombocytopenia/myelocytes  -Patient denies any complaint of bleeding except nosebleed she had few days back, go head and send anemia workup, patient is under workup for questionable non-Hodgkin's lymphoma.  -Advised patient to  follow-up closely with her primary care, no infectious source identified currently therefore will continue to monitor.    Hpertension  -Normotensive currently we are holding her enalapril.    Sore throat  -Does not appear at all to be congested when necessary Magic mouthwash if symptomatic.    History of breast cancer  -Follow-up with the primary care/oncologist.    Pedal edema with mildly elevated BNP normal chest   x-ray.  -Does not complain of any PND symptoms or orthopnea continue to monitor.    Chronic abd pain  -co of reflux sx , continue protonix,   -fup Out pt with primary GI.        DVT PXL  -jesus manuel's/scds  -lovenox none secondary to low platelets.        I discussed the patients findings and my recommendations with: patient/family.      Saumya Chinchilla MD  03/25/17  11:36 PM    Please note that portions of this note may have been completed with a voice recognition program. Efforts were made to edit the dictations, but occasionally words are mistranscribed.

## 2017-03-26 NOTE — PROGRESS NOTES
"Adult Nutrition  Assessment/PES    Patient Name:  Taina Marinelli  YOB: 1930  MRN: [id number]  Admit Date:  3/25/2017    Assessment Date:  3/26/2017     Comments: RD initial nutrition assessment complete. Pt reports poor appetite/intake for the past 1 year with 9 lb unintentional wt loss. Will send Boost Plus supplements 3x/day and continue to follow.           Reason for Assessment       03/26/17 1031    Reason for Assessment    Reason For Assessment/Visit identified at risk by screening criteria    Identified At Risk By Screening Criteria MST SCORE 2+    Time Spent (min) 20    Diagnosis Diagnosis    Gastrointestinal Other (comment)   abnormal area of inflammation of her ileum- currently being worked up by outpatient GI, currently has pill camera in her intestine    Hematological Anemia    Oncology Lymphoma   currently being worked up by oncology    Renal ARF    Other diagnosis generalized weakness, mild confusion   Principal Problem:    Acute on chronic renal failure  Active Problems:    History of breast cancer    Non Hodgkin's lymphoma    Generalized weakness    Thrombocytopenia    Anemia    Encephalopathy, metabolic    Hyperkalemia             Nutrition/Diet History       03/26/17 1033    Nutrition/Diet History    Reported/Observed By Patient    Appetite Poor   x1 year            Anthropometrics       03/26/17 1034    Anthropometrics    Height 162.6 cm (64\")    Weight 49.4 kg (109 lb)    Ideal Body Weight (IBW)    Ideal Body Weight (IBW), Female 55.4    % Ideal Body Weight 89.43    Usual Body Weight (UBW)    Weight Loss 4.082 kg (9 lb)   unintentional    Weight Loss Time Frame 1 year    Body Mass Index (BMI)    BMI (kg/m2) 18.75            Labs/Tests/Procedures/Meds       03/26/17 1034    Labs/Tests/Procedures/Meds    Labs/Tests Review Reviewed;BUN;Creat;K+;BNP                Nutrition Prescription Ordered       03/26/17 1034    Nutrition Prescription PO    Current PO Diet Full Liquid          "   Evaluation of Received Nutrient/Fluid Intake       03/26/17 1034    PO Evaluation    Number of Meals 1    % PO Intake 100   reflects intake of full liquids              Problem/Interventions:        Problem 1       03/26/17 1035    Nutrition Diagnoses Problem 1    Problem 1 Inadequate Intake/Infusion    Inadequate Intake Type Oral    Etiology (related to) --   clinical condition/poor appetite    Signs/Symptoms (evidenced by) Report of Mnimal PO Intake                    Intervention Goal       03/26/17 1035    Intervention Goal    General Nutrition support treatment    PO Tolerate PO;Establish PO            Nutrition Intervention       03/26/17 1035    Nutrition Intervention    RD/Tech Action Advise alternate selection;Care plan reviewd;Follow Tx progress;Supplement provided;Recommend/ordered;Encourage intake    Recommended/Ordered Supplement            Nutrition Prescription       03/26/17 1035    Nutrition Prescription PO    PO Prescription Begin/change supplement    Supplement Boost Plus    Supplement Frequency 3 times a day    New PO Prescription Ordered? Yes            Education/Evaluation       03/26/17 1036    Monitor/Evaluation    Monitor Per protocol;PO intake;Supplement intake;Pertinent labs          Electronically signed by:  Almita Jansen MS RD/LD CNSC  03/26/17 10:36 AM

## 2017-03-26 NOTE — PROGRESS NOTES
"      HOSPITALIST DAILY PROGRESS NOTE    Chief Complaint: f/u generalized weakness, lethargy    Subjective   SUBJECTIVE/OVERNIGHT EVENTS   Patient seen this morning. She is very lethargic. Easy to arouse and knows her name, year, where she is, but quickly falls back asleep. Denies any pain. No nausea, vomiting, abdominal pain.     Review of Systems:  Gen-no fevers, no chills  CV-no chest pain, no palpitations  Resp-no cough, no dyspnea  GI-no N/V/D, no abd pain    Objective   OBJECTIVE   I have reviewed the vital signs.  /58 (BP Location: Right arm, Patient Position: Lying)  Pulse 95  Temp 98 °F (36.7 °C) (Oral)   Resp 18  Ht 64\" (162.6 cm)  Wt 109 lb (49.4 kg)  SpO2 96%  BMI 18.71 kg/m2    Physical Exam:  Gen-no acute distress  CV-RRR, S1 S2 normal, no m/r/g  Resp-CTAB, no wheezes  Abd-soft, NT, ND, +BS  Ext-no edema  Neuro-lethargic, oriented x 3, no focal deficits  Psych-appropriate mood    Results:  I have reviewed the labs, culture data, radiology results, and diagnostic studies.      Results from last 7 days  Lab Units 03/26/17  0552 03/25/17 1958   WBC 10*3/mm3 10.33 11.22*   HEMOGLOBIN g/dL 8.9* 9.6*   HEMATOCRIT % 26.9* 27.5*   PLATELETS 10*3/mm3 70* 71*       Results from last 7 days  Lab Units 03/26/17  0552   SODIUM mmol/L 133   POTASSIUM mmol/L 5.0   CHLORIDE mmol/L 100   TOTAL CO2 mmol/L 17.0*   BUN mg/dL 64*   CREATININE mg/dL 3.10*   GLUCOSE mg/dL 85   CALCIUM mg/dL 8.2*       Culture Data:  Cultures:           Radiology Results:  Imaging Results (last 24 hours)     Procedure Component Value Units Date/Time    XR Ribs Right With PA Chest [[id number]] Updated:  03/25/17 2021    CT Head Without Contrast [[id number]]  (Abnormal) Collected:  03/25/17 2000     Updated:  03/25/17 2125    Narrative:         EXAM:    CT Head Without Intravenous Contrast.    CLINICAL HISTORY:    87 years old, female; Signs and symptoms; Altered mental status/memory loss;   Additional info: Change in mentation/ho " cancer    TECHNIQUE:    Axial computed tomography images of the head/brain without intravenous   contrast.  This CT exam was performed using one or more of the following dose   reduction techniques:  automated exposure control, adjustment of the mA and/or   kV according to patient size, and/or use of iterative reconstruction technique.    COMPARISON:    No relevant prior studies available.    FINDINGS:    Brain:  Generalized cerebral involutional change, age-appropriate.    Subcortical and periventricular white matter hypodensities.  No hemorrhage.  No   mass or midline shift.    Ventricles:  Ventricular system demonstrates moderate diffuse compensatory   enlargement.    Bones/joints:  Unremarkable.  No acute fracture.    Soft tissues:  Unremarkable.    Sinuses:  Unremarkable as visualized.  No acute sinusitis.    Mastoid air cells:  Unremarkable as visualized.  No mastoid effusion.      Impression:       1.  No acute abnormality.  2.  Generalized cerebral involutional change, age-appropriate.    3.  Chronic microvascular ischemic disease.    THIS DOCUMENT HAS BEEN ELECTRONICALLY SIGNED BY CHRIST DAS MD          I have reviewed the medications.      Assessment/Plan   ASSESSMENT/PLAN    Principal Problem:    Acute on chronic renal failure  Active Problems:    Generalized weakness    Encephalopathy, metabolic    Hyperkalemia    History of breast cancer    Non Hodgkin's lymphoma    Thrombocytopenia    Anemia    86 yo F with hx of breast cancer, undergoing workup for lymphoma as well as inflammation of her ileum undergoing workup with GI (currently has pill camera), presents with 2-3 week history of generalized weakness and functional decline, along with lethargy and confusion. She has had all her care at [facility]. Labs revealed renal failure.    PLAN:  --Continue IVF's. Hyperkalemia improved. Cr some better but unknown baseline. Awaiting records from [facility].  --CT head in ER negative. She is still lethargic this  morning. Gabapentin TID was reordered and she has received two doses since admission--will HOLD especially in setting of renal failure and risk of accumulation.  --Regarding her cancer workup as well as GI workup, will likely not address those issues here as she has been followed closely by other specialists at [facility] (Dr. Arnold and Dr. Alvarez).   --Consult PT/OT and CM for discharge planning.      I expect patient to be discharged in: 2 days    Sonia Gordon MD  03/26/17  8:42 AM

## 2017-03-26 NOTE — ED PROVIDER NOTES
Subjective   Patient is a 87 y.o. female presenting with weakness.   History provided by:  Patient and relative  Weakness - Generalized   Severity:  Moderate  Onset quality:  Gradual  Timing:  Constant  Progression:  Worsening  Chronicity:  New  Context comment:  Patient has lymphoma, and a history of breast cancer.  Is currently getting oral chemotherapy.  Last dose 2 weeks ago.  Relieved by:  Nothing  Worsened by:  Nothing  Ineffective treatments:  None tried  Associated symptoms: dizziness and falls    Associated symptoms: no abdominal pain, no diarrhea, no dysuria, no fever, no headaches and no shortness of breath    Associated symptoms comment:  Loss of appetite, sore throat      Review of Systems   Constitutional: Positive for fatigue. Negative for chills and fever.   Respiratory: Negative for shortness of breath.    Gastrointestinal: Negative for abdominal pain and diarrhea.   Genitourinary: Negative for dysuria.   Musculoskeletal: Positive for falls.   Neurological: Positive for dizziness, weakness and light-headedness. Negative for headaches.   All other systems reviewed and are negative.      Past Medical History:   Diagnosis Date   • Breast cancer    • Non Hodgkin's lymphoma        Allergies   Allergen Reactions   • Penicillins        Past Surgical History:   Procedure Laterality Date   • APPENDECTOMY     • HYSTERECTOMY     • MASTECTOMY         History reviewed. No pertinent family history.    Social History     Social History   • Marital status:      Spouse name: N/A   • Number of children: N/A   • Years of education: N/A     Social History Main Topics   • Smoking status: Former Smoker   • Smokeless tobacco: None      Comment: unknown when quit   • Alcohol use No   • Drug use: No   • Sexual activity: Not Asked     Other Topics Concern   • None     Social History Narrative   • None           Objective   Physical Exam   Constitutional: She is oriented to person, place, and time. She appears  well-developed and well-nourished.   HENT:   Right Ear: External ear normal.   Left Ear: External ear normal.   Mouth/Throat: Uvula is midline. Mucous membranes are dry.   Cardiovascular: Normal rate.    Pulmonary/Chest: Effort normal and breath sounds normal. No respiratory distress. She has no wheezes. She has no rales. She exhibits tenderness (Left lateral rib tenderness).   Abdominal: Soft. Bowel sounds are normal.   Neurological: She is alert and oriented to person, place, and time.   Skin: Skin is warm and dry. Rash (Fine papular rash on the bilateral hands, and right neck) noted.   Psychiatric: She has a normal mood and affect. Her behavior is normal.   Vitals reviewed.      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  Recent Results (from the past 24 hour(s))   Comprehensive Metabolic Panel    Collection Time: 03/25/17  7:58 PM   Result Value Ref Range    Glucose 86 70 - 100 mg/dL    BUN 72 (H) 9 - 23 mg/dL    Creatinine 3.30 (H) 0.60 - 1.30 mg/dL    Sodium 132 132 - 146 mmol/L    Potassium 6.0 (H) 3.5 - 5.5 mmol/L    Chloride 97 (L) 99 - 109 mmol/L    CO2 25.0 20.0 - 31.0 mmol/L    Calcium 8.9 8.7 - 10.4 mg/dL    Total Protein 5.9 5.7 - 8.2 g/dL    Albumin 3.60 3.20 - 4.80 g/dL    ALT (SGPT) 18 7 - 40 U/L    AST (SGOT) 106 (H) 0 - 33 U/L    Alkaline Phosphatase 162 (H) 25 - 100 U/L    Total Bilirubin 0.5 0.3 - 1.2 mg/dL    eGFR Non African Amer 13 (L) >60 mL/min/1.73    Globulin 2.3 gm/dL    A/G Ratio 1.6 1.5 - 2.5 g/dL    BUN/Creatinine Ratio 21.8 7.0 - 25.0    Anion Gap 10.0 3.0 - 11.0 mmol/L   Lipase    Collection Time: 03/25/17  7:58 PM   Result Value Ref Range    Lipase 31 6 - 51 U/L   TSH    Collection Time: 03/25/17  7:58 PM   Result Value Ref Range    TSH 3.236 0.350 - 5.350 mIU/mL   BNP    Collection Time: 03/25/17  7:58 PM   Result Value  Ref Range    .0 (H) 0.0 - 100.0 pg/mL   CBC Auto Differential    Collection Time: 03/25/17  7:58 PM   Result Value Ref Range    WBC 11.22 (H) 3.50 - 10.80 10*3/mm3    RBC 3.19 (L) 3.89 - 5.14 10*6/mm3    Hemoglobin 9.6 (L) 11.5 - 15.5 g/dL    Hematocrit 27.5 (L) 34.5 - 44.0 %    MCV 86.2 80.0 - 99.0 fL    MCH 30.1 27.0 - 31.0 pg    MCHC 34.9 32.0 - 36.0 g/dL    RDW 14.7 (H) 11.3 - 14.5 %    RDW-SD 46.6 37.0 - 54.0 fl    MPV 8.9 6.0 - 12.0 fL    Platelets 71 (L) 150 - 450 10*3/mm3   Scan Slide    Collection Time: 03/25/17  7:58 PM   Result Value Ref Range    Scan Slide     Manual Differential    Collection Time: 03/25/17  7:58 PM   Result Value Ref Range    Neutrophil % 31.0 (L) 41.0 - 71.0 %    Lymphocyte % 18.0 (L) 24.0 - 44.0 %    Monocyte % 1.0 0.0 - 12.0 %    Eosinophil % 1.0 0.0 - 3.0 %    Basophil % 0.0 0.0 - 1.0 %    Bands %  7.0 (H) 0.0 - 5.0 %    Metamyelocyte % 10.0 (H) 0.0 - 0.0 %    Myelocyte % 25.0 (H) 0.0 - 0.0 %    Atypical Lymphocyte % 7.0 (H) 0.0 - 5.0 %    Neutrophils Absolute 4.26 1.50 - 8.30 10*3/mm3    Lymphocytes Absolute 2.02 0.60 - 4.80 10*3/mm3    Monocytes Absolute 0.11 0.00 - 1.00 10*3/mm3    Eosinophils Absolute 0.11 0.10 - 0.30 10*3/mm3    Basophils Absolute 0.00 0.00 - 0.20 10*3/mm3    RBC Morphology Normal Normal    WBC Morphology Normal Normal    Platelet Morphology Normal Normal   POC Troponin, Rapid    Collection Time: 03/25/17  8:00 PM   Result Value Ref Range    Troponin I 0.02 0.00 - 0.07 ng/mL   Rapid Strep A Screen    Collection Time: 03/25/17  9:14 PM   Result Value Ref Range    Strep A Ag Negative Negative   Urinalysis With / Culture If Indicated    Collection Time: 03/25/17  9:38 PM   Result Value Ref Range    Color, UA Yellow Yellow, Straw    Appearance, UA Cloudy (A) Clear    pH, UA 5.5 5.0 - 8.0    Specific Gravity, UA 1.014 1.001 - 1.030    Glucose, UA Negative Negative    Ketones, UA Negative Negative    Bilirubin, UA Negative Negative    Blood, UA Negative  "Negative    Protein,  mg/dL (2+) (A) Negative    Leuk Esterase, UA Negative Negative    Nitrite, UA Negative Negative    Urobilinogen, UA 1.0 E.U./dL 0.2 - 1.0 E.U./dL   Urinalysis, Microscopic Only    Collection Time: 03/25/17  9:38 PM   Result Value Ref Range    RBC, UA 0-2 None Seen, 0-2 /HPF    WBC, UA 0-2 (A) None Seen /HPF    Bacteria, UA None Seen None Seen, Trace /HPF    Squamous Epithelial Cells, UA 0-2 None Seen, 0-2 /HPF    Hyaline Casts, UA 0-6 0 - 6 /LPF    Amorphous Crystals, UA Small/1+ None Seen /HPF    Methodology Manual Light Microscopy      Note: In addition to lab results from this visit, the labs listed above may include labs taken at another facility or during a different encounter within the last 24 hours. Please correlate lab times with ED admission and discharge times for further clarification of the services performed during this visit.    CT Head Without Contrast   Final Result   Abnormal   1.  No acute abnormality.   2.  Generalized cerebral involutional change, age-appropriate.     3.  Chronic microvascular ischemic disease.      THIS DOCUMENT HAS BEEN ELECTRONICALLY SIGNED BY CHRIST DAS MD      XR Ribs Right With PA Chest    (Results Pending)     Vitals:    03/25/17 1841   BP: 125/59   BP Location: Right arm   Patient Position: Sitting   Pulse: 90   Resp: 16   Temp: 98.9 °F (37.2 °C)   TempSrc: Oral   SpO2: 97%   Weight: 109 lb (49.4 kg)   Height: 64\" (162.6 cm)     Medications   sodium chloride 0.9 % flush 10 mL (not administered)   sodium chloride 0.9 % infusion (not administered)   sodium chloride 0.9 % bolus 1,000 mL (0 mL Intravenous Stopped 3/25/17 2200)     ECG/EMG Results (last 24 hours)     ** No results found for the last 24 hours. **                  [facility]    Final diagnoses:   Dehydration, moderate   Acute on chronic renal failure   Dizziness   Weakness            Ofelia Gutierrez, APRN  03/25/17 2227    "

## 2017-03-27 PROBLEM — I48.91 ATRIAL FIBRILLATION WITH RVR (HCC): Status: ACTIVE | Noted: 2017-01-01

## 2017-03-27 NOTE — PROGRESS NOTES
"    [facility] Medicine Services  INPATIENT PROGRESS NOTE    Date of Admission: 3/25/2017  Length of Stay: 2  Primary Care Physician: Asia Masters MD    Subjective-- HPI/Events overnight/ROS/CC- Hospital follow visit.  Detailed ROS not detailed as performed below      Notified by RN while I was on the floor that pt need my immediate attention due to \"SVT.\" Patient is sitting up in bed, alert, talking, but reports worsening of chronic chest tightness/dyspnea. Denies any pain. Denies any previous \"heart problems\" or arhythmia. Tele shows irregular rhythm with no discernable P wave, consistent with Afib with RVR.    Objective      Temp:  [97.6 °F (36.4 °C)-103.6 °F (39.8 °C)] 97.6 °F (36.4 °C)  Heart Rate:  [] 104  Resp:  [16-20] 16  BP: ()/(46-70) 103/52    Physical Exam:  Physical Exam    General Assessment: No acute cardiopulmonary distress.     CVS: Irregularly irregular, tachycardic, S1S2 normal, no murmurs    Resp: CTAB, no adventitious sound    Abd: soft, NT, ND, normal BS, no guarding or peritoneal signs    Ext: LUE larger than right    Neuro: Nonfocal    Skin: W/D/I. No rash.    Psych: Affect is appropriate    Results Review:    I have reviewed the labs, radiology results and diagnostic studies.      Results from last 7 days  Lab Units 03/27/17  0609   WBC 10*3/mm3 14.59*   HEMOGLOBIN g/dL 8.5*   PLATELETS 10*3/mm3 55*       Results from last 7 days  Lab Units 03/27/17  0609   SODIUM mmol/L 136   POTASSIUM mmol/L 5.0   TOTAL CO2 mmol/L 16.0*   CREATININE mg/dL 3.00*   GLUCOSE mg/dL 98       Culture Data:  Radiology Data:     I have reviewed the medications.        Assessment/Plan     Assessment/Problem List  86 yo F with hx of breast cancer, undergoing workup for lymphoma as well as inflammation of her ileum undergoing workup with GI (currently has pill camera), presents with 2-3 week history of generalized weakness and functional decline, along with lethargy and " confusion. She has had all her care at [facility]. Labs revealed renal failure.    Principal Problem:    Acute on chronic renal failure  Active Problems:    History of breast cancer    Non Hodgkin's lymphoma    Generalized weakness    Thrombocytopenia    Anemia    Encephalopathy, metabolic    Hyperkalemia    Atrial fibrillation with RVR, new onset    Plan  - new onset atrial fib with RVR today, etiology unclear at this time. Further w/u includes serial trop, EKG, echo, basic labs, and cardiology consultation. Started on cardizem gtt for now.  - Renal function with no significant changes, records from [facility] are still not available yet  - Duplex of LUE/LLE to rule out DVT  - monitor Hb/platelet, stable at this time  - Cancer and GI workup at [facility], may still have Pill Camera inside her.    I spent about more that 30 min on this case and >50% of my time was spent with patient while she was in afib with RVR/chest discomfort.      Margarita De Dios MD   03/27/17   3:21 PM    Please note that portions of this note may have been completed with a voice recognition program. Efforts were made to edit the dictations, but occasionally words are mistranscribed.

## 2017-03-27 NOTE — PROGRESS NOTES
Acute Care - Physical Therapy Initial Evaluation   [place]     Patient Name: Taina Marinelli  : 3/10/1930  MRN: [id number]  Today's Date: 3/27/2017   Onset of Illness/Injury or Date of Surgery Date: 17  Date of Referral to PT: 17  Referring Physician: MD Amor       Admit Date: 3/25/2017     Visit Dx:    ICD-10-CM ICD-9-CM   1. Dehydration, moderate E86.0 276.51   2. Acute on chronic renal failure N17.9 584.9    N18.9 585.9   3. Dizziness R42 780.4   4. Weakness R53.1 780.79   5. Impaired mobility and ADLs Z74.09 799.89   6. Impaired functional mobility, balance, gait, and endurance Z74.09 V49.89     Patient Active Problem List   Diagnosis   • Acute on chronic renal failure   • History of breast cancer   • Non Hodgkin's lymphoma   • Generalized weakness   • Thrombocytopenia   • Anemia   • Encephalopathy, metabolic   • Hyperkalemia     Past Medical History:   Diagnosis Date   • Breast cancer    • Dysphagia 2016   • Esotropia 2016   • Hyperlipidemia 2016   • Hypertension    • Non Hodgkin's lymphoma    • Polyarthritis 2016   • Seborrheic keratosis 2016   • Secondary malignant neoplasm 10/15/2015     Past Surgical History:   Procedure Laterality Date   • APPENDECTOMY     • CATARACT EXTRACTION  2013   • HYSTERECTOMY     • MASTECTOMY     • TONSILLECTOMY AND ADENOIDECTOMY            PT ASSESSMENT (last 72 hours)      PT Evaluation       17 0821 17 0820    Rehab Evaluation    Document Type evaluation  -EL evaluation  -SR    Subjective Information agree to therapy;complains of;weakness;fatigue  -EL agree to therapy;complains of;weakness;fatigue  -SR    Patient Effort, Rehab Treatment good  -EL good  -SR    Symptoms Noted During/After Treatment fatigue  -EL fatigue  -SR    Symptoms Noted Comment RN notified   -EL     General Information    Patient Profile Review yes  -EL yes  -SR    Onset of Illness/Injury or Date of Surgery Date 17  -EL 17  -SR     Referring Physician MD Amor   -EL MD Amor  -SR    General Observations Pt supine in bed on arrival, IV R UE, on 1.5L O2 NC, family present.  -EL Pt is supine in bed, has O2, IV, family present.  -SR    Pertinent History Of Current Problem To ED 3/25 with c/o generalized weakness, functional decline, multiple falls at home, mild confusion, and lethargy x 2-3 days. CT head neg. Dx with A/C renal failure and metabolic encephalopathy due to dehydration. h/o breast CA and non-Hodgkin's lymphoma.     -EL Pt was admitted with generalized weakness/overall decline over past 2-3 weeks. CT of head was negative. Pt found to be in renal failure in ER.  -SR    Precautions/Limitations fall precautions;oxygen therapy device and L/min   confusion/ delayed processing   -EL fall precautions;oxygen therapy device and L/min;other (see comments)   confusion  -SR    Prior Level of Function independent:;all household mobility;community mobility;transfer;dressing;bathing;max assist:;cooking;cleaning;home management;dependent:;driving;shopping  -EL independent:;all household mobility;community mobility;ADL's  -SR    Equipment Currently Used at Home raised toilet;grab bar;shower chair  -EL none  -SR    Plans/Goals Discussed With patient;agreed upon  -EL patient and family;agreed upon  -SR    Risks Reviewed patient:;increased discomfort  -EL patient and family:;LOB;increased discomfort;change in vital signs;lines disloged  -SR    Benefits Reviewed patient:;improve function;increase independence  -EL patient and family:;improve function;increase independence;increase strength;increase balance;increase knowledge  -SR    Barriers to Rehab medically complex;cognitive status  -EL cognitive status  -SR    Living Environment    Lives With alone  -EL alone  -SR    Living Arrangements house  -EL house  -SR    Home Accessibility stairs to enter home;bed and bath on same level;tub/shower is not walk in  -EL stairs to enter home  -SR    Number of  Stairs to Enter Home 2  -EL 2  -SR    Stair Railings at Home outside, present on right side  -EL outside, present on right side  -SR    Clinical Impression    Date of Referral to PT  03/27/17  -SR    PT Diagnosis  weakness  -SR    Patient/Family Goals Statement  to get stronger  -SR    Criteria for Skilled Therapeutic Interventions Met  yes;treatment indicated  -SR    Rehab Potential  good, to achieve stated therapy goals  -SR    Vital Signs    Pre Systolic BP Rehab 117  -  -SR    Pre Treatment Diastolic BP 60  -EL 60  -SR    Post Systolic BP Rehab 112  -  -SR    Post Treatment Diastolic BP 51  -EL 51  -SR    Pretreatment Heart Rate (beats/min) 88  -EL 88  -SR    Posttreatment Heart Rate (beats/min) 91  -EL 91  -SR    Pre SpO2 (%) 95  -EL 95  -SR    O2 Delivery Pre Treatment supplemental O2   1.5L  -EL supplemental O2  -SR    Post SpO2 (%) 94  -EL 94  -SR    O2 Delivery Post Treatment supplemental O2   1.5L  -EL supplemental O2  -SR    Pre Patient Position Supine  -EL Supine  -SR    Intra Patient Position Standing  -EL Standing  -SR    Post Patient Position Supine  -EL Supine  -SR    Pain Assessment    Pain Assessment No/denies pain  -EL No/denies pain  -SR    Vision Assessment/Intervention    Visual Impairment WFL with corrective lenses  -EL     Cognitive Assessment/Intervention    Current Cognitive/Communication Assessment impaired  -EL impaired  -SR    Orientation Status oriented to;person;place;required verbal cueing (specifiy in comments);time   delayed processing throughout   -EL oriented to;person;place;required verbal cueing (specifiy in comments);time   pt is confused throughout, decreased processing  -SR    Follows Commands/Answers Questions able to follow single-step instructions;needs cueing;needs increased time;needs repetition;100% of the time  -% of the time;needs cueing;needs increased time;needs repetition  -SR    Personal Safety mild impairment;decreased awareness, need for safety   -EL mild impairment  -SR    Personal Safety Interventions gait belt;fall prevention program maintained;elopement precautions initiated;nonskid shoes/slippers when out of bed  -EL fall prevention program maintained;gait belt;nonskid shoes/slippers when out of bed;elopement precautions initiated  -SR    ROM (Range of Motion)    General ROM no range of motion deficits identified  -EL no range of motion deficits identified  -SR    MMT (Manual Muscle Testing)    General MMT Assessment upper extremity strength deficits identified  -EL lower extremity strength deficits identified  -SR    General MMT Assessment Detail B UE grossly 3+/5  -EL BLE functionally 3+/5  -SR    Bed Mobility, Assessment/Treatment    Bed Mobility, Assistive Device head of bed elevated;draw sheet;bed rails  -EL bed rails;draw sheet;head of bed elevated  -SR    Bed Mob, Supine to Sit, [place] moderate assist (50% patient effort);2 person assist required;verbal cues required  -EL moderate assist (50% patient effort);2 person assist required  -SR    Bed Mob, Sit to Supine, [place] moderate assist (50% patient effort);2 person assist required;verbal cues required  -EL moderate assist (50% patient effort)  -SR    Bed Mobility, Safety Issues decreased use of arms for pushing/pulling;decreased use of legs for bridging/pushing;impaired trunk control for bed mobility  -EL decreased use of arms for pushing/pulling;decreased use of legs for bridging/pushing  -SR    Bed Mobility, Impairments strength decreased  -EL strength decreased  -SR    Bed Mobility, Comment VC for sequencing; required increased time to complete   -EL     Transfer Assessment/Treatment    Transfers, Sit-Stand [place] moderate assist (50% patient effort);2 person assist required;verbal cues required  -EL moderate assist (50% patient effort);2 person assist required;verbal cues required  -SR    Transfers, Stand-Sit [place] moderate assist (50% patient effort);2 person  assist required;verbal cues required  -EL moderate assist (50% patient effort);2 person assist required;verbal cues required  -SR    Transfers, Sit-Stand-Sit, Assist Device rolling walker  -EL rolling walker  -SR    Toilet Transfer, [place] moderate assist (50% patient effort);2 person assist required;verbal cues required  -EL moderate assist (50% patient effort);2 person assist required  -SR    Toilet Transfer, Assistive Device rolling walker;bedside commode without drop arms  -EL rolling walker   dependent for toileting, had BM in depends  -SR    Transfer, Comment with BM in brief upon arrival; transferred to [facility] and completed toileting; dependent for post-toilet hygiene in standing with min assist to maintain static standing balance  -EL constant cueing needed for safety awareness  -SR    Gait Assessment/Treatment    Gait, [place] Level  minimum assist (75% patient effort);2 person assist required  -SR    Gait, Assistive Device  rolling walker  -SR    Gait, Distance (Feet)  20  -SR    Gait, Gait Deviations  iris decreased;forward flexed posture;step length decreased;stride length decreased;ataxia  -SR    Gait, Safety Issues  sequencing ability decreased;step length decreased;balance decreased during turns;weight-shifting ability decreased;supplemental O2  -SR    Gait, Impairments  strength decreased;impaired balance;coordination impaired  -SR    Gait, Comment  pt is unable to steer rw on her own, she needs constant cueing for utilization of rw  -SR    Positioning and Restraints    Pre-Treatment Position  in bed  -SR    Post Treatment Position  bed  -SR    In Bed  notified nsg;supine;call light within reach;encouraged to call for assist;exit alarm on;with family/caregiver  -SR      03/25/17 5948       General Information    Equipment Currently Used at Home raised toilet  -AB     Living Environment    Lives With alone  -AB     Living Arrangements house  -AB     Home Accessibility bed and bath on same  level  -AB     Stair Railings at Home outside, present on right side  -AB     Type of Financial/Environmental Concern none  -AB     Transportation Available car  -AB       User Key  (r) = Recorded By, (t) = Taken By, (c) = Cosigned By    Initials Name Provider Type    SR Radha Painting, DELL Physical Therapist    LINNEA Ford, OT Occupational Therapist    AB Juan A Wilson RN Registered Nurse          Physical Therapy Education     Title: PT OT SLP Therapies (Active)     Topic: Physical Therapy (Active)     Point: Mobility training (Active)    Learning Progress Summary    Learner Readiness Method Response Comment Documented by Status   Patient Acceptance E,D NR  SR 03/27/17 0945 Active   Family Acceptance E,D NR  SR 03/27/17 0945 Active               Point: Body mechanics (Active)    Learning Progress Summary    Learner Readiness Method Response Comment Documented by Status   Patient Acceptance E,D NR  SR 03/27/17 0945 Active   Family Acceptance E,D NR  SR 03/27/17 0945 Active               Point: Precautions (Active)    Learning Progress Summary    Learner Readiness Method Response Comment Documented by Status   Patient Acceptance E,D NR  SR 03/27/17 0945 Active   Family Acceptance E,D NR  SR 03/27/17 0945 Active                      User Key     Initials Effective Dates Name Provider Type [facility] 06/19/15 -  Radha Painting, PT Physical Therapist PT                PT Recommendation and Plan  Anticipated Discharge Disposition: inpatient rehabilitation facility  PT Frequency: daily, per priority policy  Plan of Care Review  Plan Of Care Reviewed With: patient, family  Outcome Summary/Follow up Plan: PT initial eval completed. Pt demo significant weakness and fatigue with all mobility, she is also confused and requires constant cueing for safety awareness. She would benefit from cont IPPT and inpatient rehab upon dc from hospital to improve strength and safety with mobility.           IP PT Goals        03/27/17 0945          Bed Mobility PT LTG    Bed Mobility PT LTG, Date Established 03/27/17  -SR      Bed Mobility PT LTG, Time to Achieve by discharge  -SR      Bed Mobility PT LTG, Activity Type all bed mobility  -SR      Bed Mobility PT LTG, [place] contact guard assist  -SR      Transfer Training PT LTG    Transfer Training PT LTG, Date Established 03/27/17  -SR      Transfer Training PT LTG, Time to Achieve by discharge  -SR      Transfer Training PT LTG, Activity Type all transfers  -SR      Transfer Training PT LTG, [place] contact guard assist  -SR      Transfer Training PT LTG, Assist Device walker, rolling  -SR      Gait Training PT LTG    Gait Training Goal PT LTG, Date Established 03/27/17  -SR      Gait Training Goal PT LTG, Time to Achieve by discharge  -SR      Gait Training Goal PT LTG, [place] contact guard assist  -SR      Gait Training Goal PT LTG, Assist Device walker, rolling  -SR      Gait Training Goal PT LTG, Distance to Achieve 100  -SR        User Key  (r) = Recorded By, (t) = Taken By, (c) = Cosigned By    Initials Name Provider Type    SR Radha Painting, PT Physical Therapist                Outcome Measures       03/27/17 0820          How much help from another person do you currently need...    Turning from your back to your side while in flat bed without using bedrails? 2  -SR      Moving from lying on back to sitting on the side of a flat bed without bedrails? 2  -SR      Moving to and from a bed to a chair (including a wheelchair)? 2  -SR      Standing up from a chair using your arms (e.g., wheelchair, bedside chair)? 2  -SR      Climbing 3-5 steps with a railing? 1  -SR      To walk in hospital room? 2  -SR      AM-PAC 6 Clicks Score 11  -SR      Functional Assessment    Outcome Measure Options AM-PAC 6 Clicks Basic Mobility (PT)  -SR        User Key  (r) = Recorded By, (t) = Taken By, (c) = Cosigned By    Initials Name Provider Type    SR Radha MORALES  Mert, PT Physical Therapist           Time Calculation:         PT Charges       03/27/17 0948          Time Calculation    Start Time 0820  -SR      PT Received On 03/27/17  -SR      PT Goal Re-Cert Due Date 04/06/17  -SR        User Key  (r) = Recorded By, (t) = Taken By, (c) = Cosigned By    Initials Name Provider Type    SR Radha Painting, PT Physical Therapist          Therapy Charges for Today     Code Description Service Date Service Provider Modifiers Qty    13839828006 HC PT EVAL HIGH COMPLEXITY 4 3/27/2017 Radha Paintign, PT GP 1          PT G-Codes  Outcome Measure Options: AM-PAC 6 Clicks Basic Mobility (PT)      Radha Painting PT  3/27/2017

## 2017-03-27 NOTE — PLAN OF CARE
Problem: Patient Care Overview (Adult)  Goal: Plan of Care Review  Outcome: Ongoing (interventions implemented as appropriate)    03/27/17 1421   Coping/Psychosocial Response Interventions   Plan Of Care Reviewed With patient   Patient Care Overview   Progress progress towards functional goals is fair   Outcome Evaluation   Outcome Summary/Follow up Plan Clinical Swallow Eval. R/o pharyngeal dysphagia. Intermittent wet vocal quality with thins. No throat clear or cough even when pushed with PO. Pt with delayed response time. REC: regular and thins when medically ready, meds whole in thins or pureed. MBS to r/o silent asp on 3/28/17

## 2017-03-27 NOTE — PROGRESS NOTES
Acute Care - Occupational Therapy Initial Evaluation   [place]     Patient Name: Taina Marinelli  : 3/10/1930  MRN: [id number]  Today's Date: 3/27/2017  Onset of Illness/Injury or Date of Surgery Date: 17  Date of Referral to OT: 17  Referring Physician: MD Amor     Admit Date: 3/25/2017       ICD-10-CM ICD-9-CM   1. Dehydration, moderate E86.0 276.51   2. Acute on chronic renal failure N17.9 584.9    N18.9 585.9   3. Dizziness R42 780.4   4. Weakness R53.1 780.79   5. Impaired mobility and ADLs Z74.09 799.89   6. Impaired functional mobility, balance, gait, and endurance Z74.09 V49.89     Patient Active Problem List   Diagnosis   • Acute on chronic renal failure   • History of breast cancer   • Non Hodgkin's lymphoma   • Generalized weakness   • Thrombocytopenia   • Anemia   • Encephalopathy, metabolic   • Hyperkalemia     Past Medical History:   Diagnosis Date   • Breast cancer    • Dysphagia 2016   • Esotropia 2016   • Hyperlipidemia 2016   • Hypertension    • Non Hodgkin's lymphoma    • Polyarthritis 2016   • Seborrheic keratosis 2016   • Secondary malignant neoplasm 10/15/2015     Past Surgical History:   Procedure Laterality Date   • APPENDECTOMY     • CATARACT EXTRACTION  2013   • HYSTERECTOMY     • MASTECTOMY     • TONSILLECTOMY AND ADENOIDECTOMY            OT ASSESSMENT FLOWSHEET (last 72 hours)      OT Evaluation       17 0821 17 0820 17 2247          Rehab Evaluation    Document Type evaluation  -EL evaluation  -SR       Subjective Information agree to therapy;complains of;weakness;fatigue  -EL agree to therapy;complains of;weakness;fatigue  -SR       Patient Effort, Rehab Treatment good  -EL good  -SR       Symptoms Noted During/After Treatment fatigue  -EL fatigue  -SR       Symptoms Noted Comment RN notified   -EL        General Information    Patient Profile Review yes  -EL yes  -SR       Onset of Illness/Injury or Date of  Surgery Date 03/25/17  -EL 03/25/17  -SR       Referring Physician MD Amor   -EL MD Amor  -SR       General Observations Pt supine in bed on arrival, IV R UE, on 1.5L O2 NC, family present.  -EL Pt is supine in bed, has O2, IV, family present.  -SR       Pertinent History Of Current Problem To ED 3/25 with c/o generalized weakness, functional decline, multiple falls at home, mild confusion, and lethargy x 2-3 days. CT head neg. Dx with A/C renal failure and metabolic encephalopathy due to dehydration. h/o breast CA and non-Hodgkin's lymphoma.     -EL Pt was admitted with generalized weakness/overall decline over past 2-3 weeks. CT of head was negative. Pt found to be in renal failure in ER.  -SR       Precautions/Limitations fall precautions;oxygen therapy device and L/min   confusion/ delayed processing   -EL fall precautions;oxygen therapy device and L/min;other (see comments)   confusion  -SR       Prior Level of Function independent:;all household mobility;community mobility;transfer;dressing;bathing;max assist:;cooking;cleaning;home management;dependent:;driving;shopping  -EL independent:;all household mobility;community mobility;ADL's  -SR       Equipment Currently Used at Home raised toilet;grab bar;shower chair  -EL none  -SR raised toilet  -AB      Plans/Goals Discussed With patient;agreed upon  -EL patient and family;agreed upon  -SR       Risks Reviewed patient:;increased discomfort  -EL patient and family:;LOB;increased discomfort;change in vital signs;lines disloged  -SR       Benefits Reviewed patient:;improve function;increase independence  -EL patient and family:;improve function;increase independence;increase strength;increase balance;increase knowledge  -SR       Barriers to Rehab medically complex;cognitive status  -EL cognitive status  -SR       Living Environment    Lives With alone  -EL alone  -SR alone  -AB      Living Arrangements house  -EL house  -SR house  -AB      Home Accessibility  stairs to enter home;bed and bath on same level;tub/shower is not walk in  -EL stairs to enter home  -SR bed and bath on same level  -AB      Number of Stairs to Enter Home 2  -EL 2  -SR       Stair Railings at Home outside, present on right side  -EL outside, present on right side  -SR outside, present on right side  -AB      Type of Financial/Environmental Concern   none  -AB      Transportation Available   car  -AB      Clinical Impression    Date of Referral to OT 03/27/17  -EL        OT Diagnosis decreased independence with ADLs   -EL        Impairments Found (describe specific impairments) aerobic capacity/endurance;gait, locomotion, and balance  -EL        Patient/Family Goals Statement return to [facility]  -EL        Criteria for Skilled Therapeutic Interventions Met yes;treatment indicated  -EL        Rehab Potential good, to achieve stated therapy goals  -EL        Therapy Frequency daily  -EL        Anticipated Discharge Disposition inpatient rehabilitation facility  -EL        Functional Level Prior    Ambulation   0-->independent  -AB      Transferring   0-->independent  -AB      Toileting   0-->independent  -AB      Bathing   0-->independent  -AB      Dressing   0-->independent  -AB      Eating   0-->independent  -AB      Communication   0-->understands/communicates without difficulty  -AB      Swallowing   0-->swallows foods/liquids without difficulty  -AB      Vital Signs    Pre Systolic BP Rehab 117  -  -SR       Pre Treatment Diastolic BP 60  -EL 60  -SR       Post Systolic BP Rehab 112  -  -SR       Post Treatment Diastolic BP 51  -EL 51  -SR       Pretreatment Heart Rate (beats/min) 88  -EL 88  -SR       Posttreatment Heart Rate (beats/min) 91  -EL 91  -SR       Pre SpO2 (%) 95  -EL 95  -SR       O2 Delivery Pre Treatment supplemental O2   1.5L  -EL supplemental O2  -SR       Post SpO2 (%) 94  -EL 94  -SR       O2 Delivery Post Treatment supplemental O2   1.5L  -EL supplemental O2  -SR        Pre Patient Position Supine  -EL Supine  -SR       Intra Patient Position Standing  -EL Standing  -SR       Post Patient Position Supine  -EL Supine  -SR       Pain Assessment    Pain Assessment No/denies pain  -EL No/denies pain  -SR       Vision Assessment/Intervention    Visual Impairment WFL with corrective lenses  -EL        Cognitive Assessment/Intervention    Current Cognitive/Communication Assessment impaired  -EL impaired  -SR       Orientation Status oriented to;person;place;required verbal cueing (specifiy in comments);time   delayed processing throughout   -EL oriented to;person;place;required verbal cueing (specifiy in comments);time   pt is confused throughout, decreased processing  -SR       Follows Commands/Answers Questions able to follow single-step instructions;needs cueing;needs increased time;needs repetition;100% of the time  -% of the time;needs cueing;needs increased time;needs repetition  -SR       Personal Safety mild impairment;decreased awareness, need for safety  -EL mild impairment  -SR       Personal Safety Interventions gait belt;fall prevention program maintained;elopement precautions initiated;nonskid shoes/slippers when out of bed  -EL fall prevention program maintained;gait belt;nonskid shoes/slippers when out of bed;elopement precautions initiated  -SR       ROM (Range of Motion)    General ROM no range of motion deficits identified  -EL no range of motion deficits identified  -SR       MMT (Manual Muscle Testing)    General MMT Assessment upper extremity strength deficits identified  -EL lower extremity strength deficits identified  -SR       General MMT Assessment Detail B UE grossly 3+/5  -EL BLE functionally 3+/5  -SR       Bed Mobility, Assessment/Treatment    Bed Mobility, Assistive Device head of bed elevated;draw sheet;bed rails  -EL bed rails;draw sheet;head of bed elevated  -SR       Bed Mob, Supine to Sit, [place] moderate assist (50% patient effort);2  person assist required;verbal cues required  -EL moderate assist (50% patient effort);2 person assist required  -SR       Bed Mob, Sit to Supine, [place] moderate assist (50% patient effort);2 person assist required;verbal cues required  -EL moderate assist (50% patient effort)  -SR       Bed Mobility, Safety Issues decreased use of arms for pushing/pulling;decreased use of legs for bridging/pushing;impaired trunk control for bed mobility  -EL decreased use of arms for pushing/pulling;decreased use of legs for bridging/pushing  -SR       Bed Mobility, Impairments strength decreased  -EL strength decreased  -SR       Bed Mobility, Comment VC for sequencing; required increased time to complete   -EL        Transfer Assessment/Treatment    Transfers, Sit-Stand [place] moderate assist (50% patient effort);2 person assist required;verbal cues required  -EL moderate assist (50% patient effort);2 person assist required;verbal cues required  -SR       Transfers, Stand-Sit [place] minimum assist (75% patient effort);2 person assist required;verbal cues required  -EL moderate assist (50% patient effort);2 person assist required;verbal cues required  -SR       Transfers, Sit-Stand-Sit, Assist Device rolling walker  -EL rolling walker  -SR       Toilet Transfer, [place] moderate assist (50% patient effort);2 person assist required;verbal cues required  -EL moderate assist (50% patient effort);2 person assist required  -SR       Toilet Transfer, Assistive Device rolling walker;bedside commode without drop arms  -EL rolling walker   dependent for toileting, had BM in depends  -SR       Transfer, Comment with BM in brief upon arrival; transferred to [facility] and completed toileting; dependent for post-toilet hygiene in standing with min assist to maintain static standing balance  -EL constant cueing needed for safety awareness  -SR       Functional Mobility    Functional Mobility- Ind. Level minimum assist (75%  patient effort);2 person assist required;verbal cues required  -EL        Functional Mobility- Device rolling walker  -EL        Functional Mobility-Distance (Feet) 30  -EL        Functional Mobility- Safety Issues balance decreased during turns;sequencing ability decreased;step length decreased  -EL        Functional Mobility- Comment VC for body mechanics with RW, upright posture, and required assist to guide RW during turns   -EL        Upper Body Dressing Assessment/Training    UB Dressing Assess/Train, Clothing Type donning:;hospital gown  -EL        UB Dressing Assess/Train, Position sitting;edge of bed  -EL        UB Dressing Assess/Train, [place] moderate assist (50% patient effort);verbal cues required  -EL        UB Dressing Assess/Train, Impairments strength decreased  -EL        Lower Body Dressing Assessment/Training    LB Dressing Assess/Train, Clothing Type doffing:;donning:   brief  -EL        LB Dressing Assess/Train, Position sitting  -EL        LB Dressing Assess/Train, [place] maximum assist (25% patient effort);verbal cues required  -EL        LB Dressing Assess/Train, Impairments strength decreased;impaired balance  -EL        Toileting Assessment/Training    Toileting Assess/Train, Assistive Device bedside commode  -EL        Toileting Assess/Train, Position sitting  -EL        Toileting Assess/Train, Indepen Level dependent (less than 25% patient effort);verbal cues required  -EL        Toileting Assess/Train, Impairments strength decreased  -EL        Toileting Assess/Train, Comment incontinent in brief on arrival; [place] to [facility] [place]ting; dependent for post-toilet hygiene in standing   -EL        Motor Skills/Interventions    Additional Documentation Balance Skills Training (Group)  -EL        Balance Skills Training    Sitting-Level of Assistance Close supervision  -EL        Sitting-Balance Support Feet supported;Right upper extremity supported;Left upper  extremity supported  -EL        Sitting-Balance Activities Trunk control activities  -EL        Standing-Level of Assistance Minimum assistance;x2  -EL        Static Standing Balance Support assistive device  -EL        Standing-Balance Activities Weight Shift A-P;Weight Shift R-L  -EL        Gait Balance-Level of Assistance Minimum assistance;x2  -EL        Gait Balance Support assistive device  -EL        Gait Balance Activities scanning environment R/L;side-stepping  -EL        Sensory Assessment/Intervention    Light Touch LUE;RUE  -EL        LUE Light Touch WNL  -EL        RUE Light Touch WNL  -EL        General Therapy Interventions    Planned Therapy Interventions activity intolerance;ADL retraining;balance training;bed mobility training;home exercise program;transfer training;strengthening  -EL        Positioning and Restraints    Pre-Treatment Position in bed  -EL in bed  -SR       Post Treatment Position bed  -EL bed  -SR       In Bed notified nsg;supine;call light within reach;encouraged to call for assist;exit alarm on;with family/caregiver  -EL notified nsg;supine;call light within reach;encouraged to call for assist;exit alarm on;with family/caregiver  -SR         User Key  (r) = Recorded By, (t) = Taken By, (c) = Cosigned By    Initials Name Effective Dates    SR Radha Painting, PT 06/19/15 -     EL Ofelia Ford OT 06/08/16 -     AB Juan A Wilson RN 02/02/17 -            Occupational Therapy Education     Title: PT OT SLP Therapies (Active)     Topic: Occupational Therapy (Done)     Point: ADL training (Done)    Description: Instruct learner(s) on proper safety adaptation and remediation techniques during self care or transfers.   Instruct in proper use of assistive devices.    Learning Progress Summary    Learner Readiness Method Response Comment Documented by Status   Patient Acceptance E VU,NR Educated on role of OT, benefits of activity, and safety for transfers. EL 03/27/17 0959 Done                Point: Body mechanics (Done)    Description: Instruct learner(s) on proper positioning and spine alignment during self-care, functional mobility activities and/or exercises.    Learning Progress Summary    Learner Readiness Method Response Comment Documented by Status   Patient Acceptance E AIDEN,NR Educated on role of OT, benefits of activity, and safety for transfers.  03/27/17 0959 Done                      User Key     Initials Effective Dates Name Provider Type Discipline     06/08/16 -  Ofelia Ford, OT Occupational Therapist OT                  OT Recommendation and Plan  Anticipated Discharge Disposition: inpatient rehabilitation facility  Planned Therapy Interventions: activity intolerance, ADL retraining, balance training, bed mobility training, home exercise program, transfer training, strengthening  Therapy Frequency: daily  Plan of Care Review  Plan Of Care Reviewed With: patient  Progress: progress toward functional goals as expected  Outcome Summary/Follow up Plan: OT jonas completed this date. Pt presents with impaired strength, balance, and activity tolerance limiting functional independence. Mildly confused with delayed processing contributing to decreased safety awareness. Would benefit from inpatient rehab prior to D/C home.          OT Goals       03/27/17 1000          Bed Mobility OT LTG    Bed Mobility OT LTG, Date Established 03/27/17  -EL      Bed Mobility OT LTG, Time to Achieve by discharge  -EL      Bed Mobility OT LTG, Activity Type supine to sit/sit to supine  -EL      Bed Mobility OT LTG, [place] Level contact guard assist;verbal cues required  -EL      Transfer Training OT LTG    Transfer Training OT LTG, Date Established 03/27/17  -EL      Transfer Training OT LTG, Time to Achieve by discharge  -EL      Transfer Training OT LTG, Activity Type toilet;sit to stand/stand to sit  -EL      Transfer Training OT LTG, [place] Level minimum assist (75% patient  effort);verbal cues required  -EL      Transfer Training OT LTG, Assist Device walker, rolling;commode, bedside  -EL      LB Dressing OT LTG    LB Dressing Goal OT LTG, Date Established 03/27/17  -EL      LB Dressing Goal OT LTG, Time to Achieve by discharge  -EL      LB Dressing Goal OT LTG, Activity Type demo LB dressing task   -EL      LB Dressing Goal OT LTG, [place] Level contact guard assist;verbal cues required  -EL      LB Dressing Goal OT LTG, Adaptive Equipment --   with AE PRN   -EL      Activity Tolerance OT LTG    Activity Tolerance Goal OT LTG, Date Established 03/27/17  -EL      Activity Tolerance Goal OT LTG, Time to Achieve by discharge  -EL      Activity Tolerance Goal OT LTG, Activity Level 10 min activity  -EL      Activity Tolerance Goal OT LTG, Additional Goal standing activity with 1 sitting rest break   -EL        User Key  (r) = Recorded By, (t) = Taken By, (c) = Cosigned By    Initials Name Provider Type    LINNEA Ford, OT Occupational Therapist                Outcome Measures       03/27/17 0821 03/27/17 0820       How much help from another person do you currently need...    Turning from your back to your side while in flat bed without using bedrails?  2  -SR     Moving from lying on back to sitting on the side of a flat bed without bedrails?  2  -SR     Moving to and from a bed to a chair (including a wheelchair)?  2  -SR     Standing up from a chair using your arms (e.g., wheelchair, bedside chair)?  2  -SR     Climbing 3-5 steps with a railing?  1  -SR     To walk in hospital room?  2  -SR     AM-PAC 6 Clicks Score  11  -SR     How much help from another is currently needed...    Putting on and taking off regular lower body clothing? 2  -EL      Bathing (including washing, rinsing, and drying) 2  -EL      Toileting (which includes using toilet bed pan or urinal) 1  -EL      Putting on and taking off regular upper body clothing 2  -EL      Taking care of personal grooming  (such as brushing teeth) 3  -EL      Eating meals 3  -EL      Score 13  -EL      Functional Assessment    Outcome Measure Options AM-PAC 6 Clicks Daily Activity (OT)  -EL AM-PAC 6 Clicks Basic Mobility (PT)  -SR       User Key  (r) = Recorded By, (t) = Taken By, (c) = Cosigned By    Initials Name Provider Type    SR Radha Painting, PT Physical Therapist    EL Ofelia Ford, OT Occupational Therapist          Time Calculation:   OT Start Time: 0821    Therapy Charges for Today     Code Description Service Date Service Provider Modifiers Qty    [id number]  OT EVAL MOD COMPLEXITY 4 3/27/2017 Ofelia Ford OT GO 1               Ofelia Ford OT  3/27/2017

## 2017-03-27 NOTE — NURSING NOTE
Patient returned to room 202 after CT, central monitoring reapplied, blood cultures drawn, will start broad spectrum antibiotics as ordered.

## 2017-03-27 NOTE — CONSULTS
[facility] Consult Note      Patient Care Team:  Asia Masters MD as PCP - General (Internal Medicine)  Asia Masters MD  No ref. provider found    Subjective     History of Present Illness:  Ms. Marinelli is a pleasant 87-year-old female with history of breast cancer, non-Hodgkin's lymphoma, acute on chronic kidney disease, and anemia.  She was admitted [facility] on 3/25/17 she is currently running fevers and blood cultures are pending.  She went into atrial fibrillation with a rapid ventricular response.  She states that she has never had this before.  She is currently denying any chest pain, shortness of breath, palpitations.  She does complain of mild dizziness.  We have been asked to see Miss Marinelli due to her atrial fibrillation with rapid ventricular response.      Current Facility-Administered Medications:   •  acetaminophen (TYLENOL) tablet 650 mg, 650 mg, Oral, Q6H PRN, Saumya Chinchilla MD, 650 mg at 03/27/17 0503  •  aspirin chewable tablet 81 mg, 81 mg, Oral, Daily, Margarita De Dios MD  •  cefTRIAXone (ROCEPHIN) IVPB 1 g, 1 g, Intravenous, Q24H, Ana Maria Gutierrez, APRN, 1 g at 03/27/17 0124  •  cholecalciferol (VITAMIN D3) tablet 2,000 Units, 2,000 Units, Oral, Daily, Saumya Chinchilla MD, 2,000 Units at 03/27/17 0811  •  diltiaZEM (CARDIZEM) 125mg/125 mL infusion, 5-15 mg/hr, Intravenous, Titrated, Margarita De Dios MD  •  docusate sodium (COLACE) capsule 100 mg, 100 mg, Oral, BID, Saumya Chinchilla MD, 100 mg at 03/27/17 0811  •  magic mouthwash oral supsension 5 mL, 5 mL, Swish & Swallow, Q4H PRN, Saumya Chinchilla MD  •  nitroglycerin (NITROSTAT) SL tablet 0.4 mg, 0.4 mg, Sublingual, Q5 Min PRN, Margarita De Dios MD  •  ondansetron (ZOFRAN) tablet 4 mg, 4 mg, Oral, Q6H PRN **OR** ondansetron (ZOFRAN) injection 4 mg, 4 mg, Intravenous, Q6H PRN, Saumya Chinchilla MD  •  pantoprazole (PROTONIX) EC tablet 40 mg, 40  mg, Oral, Daily, Saumya Chinchilla MD, 40 mg at 03/27/17 0811  •  Pharmacy to dose vancomycin, , Does not apply, Continuous PRN, FOREST Gilmore  •  saccharomyces boulardii (FLORASTOR) capsule 250 mg, 250 mg, Oral, Daily, Saumya Chinchilla MD, 250 mg at 03/27/17 0811  •  sodium chloride 0.9 % flush 1-10 mL, 1-10 mL, Intravenous, PRN, Saumya Chinchilla MD  •  Insert peripheral IV, , , Once **AND** sodium chloride 0.9 % flush 10 mL, 10 mL, Intravenous, PRN, FOREST Aburto  •  sodium chloride 0.9 % infusion, 125 mL/hr, Intravenous, Continuous, FOREST Aburto, Last Rate: 125 mL/hr at 03/25/17 2330, 125 mL/hr at 03/25/17 2330  •  [START ON 3/28/2017] vancomycin (dosing per levels), , Does not apply, Daily, FOREST Gilmore    Social History     Social History   • Marital status:      Spouse name: N/A   • Number of children: N/A   • Years of education: N/A     Occupational History   • Not on file.     Social History Main Topics   • Smoking status: Former Smoker   • Smokeless tobacco: Not on file      Comment: unknown when quit   • Alcohol use No   • Drug use: No   • Sexual activity: Not on file     Other Topics Concern   • Not on file     Social History Narrative   • No narrative on file       History reviewed. No pertinent family history.    Review of Systems   Constitutional: Positive for activity change, fatigue and fever.   HENT: Negative.    Eyes: Negative.    Respiratory: Negative.    Cardiovascular: Negative.    Gastrointestinal: Negative.    Endocrine: Negative.    Genitourinary: Negative.    Musculoskeletal: Positive for arthralgias.   Skin: Negative.    Neurological: Positive for dizziness.   Hematological: Negative.    Psychiatric/Behavioral: Negative.           Objective     Vital Signs  Temp:  [98.1 °F (36.7 °C)-103.6 °F (39.8 °C)] 101 °F (38.3 °C)  Heart Rate:  [87-94] 87  Resp:  [16-20] 16  BP: (105-117)/(42-70) 117/60      Intake/Output Summary (Last 24 hours) at 03/27/17 1040  Last  data filed at 03/26/17 2300   Gross per 24 hour   Intake              480 ml   Output             1050 ml   Net             -570 ml        Physical Exam   Constitutional: She appears well-developed and well-nourished.   HENT:   Head: Normocephalic and atraumatic.   Eyes: Conjunctivae and EOM are normal. Pupils are equal, round, and reactive to light.   Neck: Normal range of motion. Neck supple. No JVD present. No tracheal deviation present.   Cardiovascular: An irregularly irregular rhythm present. Tachycardia present.  Exam reveals no gallop and no S3.    Pulmonary/Chest: Effort normal and breath sounds normal. She has no wheezes. She has no rales.   Abdominal: Soft. Bowel sounds are normal. She exhibits no distension.   Musculoskeletal: Normal range of motion. She exhibits no edema.   Neurological: She is alert.   Skin: Skin is warm and dry.   Psychiatric: She has a normal mood and affect.             Results Review:    I reviewed the patient's new clinical results.    WBC WBC   Date/Time Value Ref Range Status   03/27/2017 0609 14.59 (H) 3.50 - 10.80 10*3/mm3 Final   03/26/2017 0552 10.33 3.50 - 10.80 10*3/mm3 Final   03/25/2017 1958 11.22 (H) 3.50 - 10.80 10*3/mm3 Final      HGB Hemoglobin   Date/Time Value Ref Range Status   03/27/2017 0609 8.5 (L) 11.5 - 15.5 g/dL Final   03/26/2017 0552 8.9 (L) 11.5 - 15.5 g/dL Final   03/25/2017 1958 9.6 (L) 11.5 - 15.5 g/dL Final      HCT Hematocrit   Date/Time Value Ref Range Status   03/27/2017 0609 25.0 (L) 34.5 - 44.0 % Final   03/26/2017 0552 26.9 (L) 34.5 - 44.0 % Final   03/25/2017 1958 27.5 (L) 34.5 - 44.0 % Final      Platlets No results found for: LABPLAT     PT/INR:      Protime   Date/Time Value Ref Range Status   03/26/2017 0552 12.0 (H) 9.6 - 11.5 Seconds Final   /  INR   Date/Time Value Ref Range Status   03/26/2017 0552 1.10  Final       Sodium Sodium   Date/Time Value Ref Range Status   03/27/2017 0609 136 132 - 146 mmol/L Final   03/26/2017 0552 133 132 -  146 mmol/L Final   03/26/2017 0011 132 132 - 146 mmol/L Final   03/25/2017 1958 132 132 - 146 mmol/L Final      Potassium Potassium   Date/Time Value Ref Range Status   03/27/2017 0609 5.0 3.5 - 5.5 mmol/L Final   03/26/2017 0552 5.0 3.5 - 5.5 mmol/L Final   03/26/2017 0011 5.1 3.5 - 5.5 mmol/L Final   03/25/2017 1958 6.0 (H) 3.5 - 5.5 mmol/L Final      Chloride Chloride   Date/Time Value Ref Range Status   03/27/2017 0609 107 99 - 109 mmol/L Final   03/26/2017 0552 100 99 - 109 mmol/L Final   03/26/2017 0011 100 99 - 109 mmol/L Final   03/25/2017 1958 97 (L) 99 - 109 mmol/L Final      Bicarbonate No results found for: PLASMABICARB   BUN BUN   Date/Time Value Ref Range Status   03/27/2017 0609 62 (H) 9 - 23 mg/dL Final   03/26/2017 0552 64 (H) 9 - 23 mg/dL Final   03/26/2017 0011 75 (H) 9 - 23 mg/dL Final   03/25/2017 1958 72 (H) 9 - 23 mg/dL Final      Creatinine Creatinine   Date/Time Value Ref Range Status   03/27/2017 0609 3.00 (H) 0.60 - 1.30 mg/dL Final   03/26/2017 0552 3.10 (H) 0.60 - 1.30 mg/dL Final   03/26/2017 0011 3.10 (H) 0.60 - 1.30 mg/dL Final   03/25/2017 1958 3.30 (H) 0.60 - 1.30 mg/dL Final      Calcium Calcium   Date/Time Value Ref Range Status   03/27/2017 0609 7.7 (L) 8.7 - 10.4 mg/dL Final   03/26/2017 0552 8.2 (L) 8.7 - 10.4 mg/dL Final   03/26/2017 0011 8.0 (L) 8.7 - 10.4 mg/dL Final   03/25/2017 1958 8.9 8.7 - 10.4 mg/dL Final      Magnesium Magnesium   Date/Time Value Ref Range Status   03/27/2017 0947 2.6 1.3 - 2.7 mg/dL Final      Troponin       No components found for: TROP                                                EKG: atrial fibrillation with RVR.    Assessment/Plan   Patient Active Problem List   Diagnosis   • Acute on chronic renal failure   • History of breast cancer   • Non Hodgkin's lymphoma   • Generalized weakness   • Thrombocytopenia   • Anemia   • Encephalopathy, metabolic   • Hyperkalemia     Rate control with IV cardizem  Check echo    I discussed the patients findings  and my recommendations with patient, family and nursing staff    EMELINA Espitia  03/27/17  10:40 AM

## 2017-03-27 NOTE — PROGRESS NOTES
Acute Care - Speech Language Pathology   Swallow Initial Evaluation [facility]   Clinical Swallow Evaluation       Patient Name: Taina Marinelli  : 3/10/1930  MRN: [id number]  Today's Date: 3/27/2017  Onset of Illness/Injury or Date of Surgery Date: 17            Admit Date: 3/25/2017    Visit Dx:     ICD-10-CM ICD-9-CM   1. Dehydration, moderate E86.0 276.51   2. Acute on chronic renal failure N17.9 584.9    N18.9 585.9   3. Dizziness R42 780.4   4. Weakness R53.1 780.79   5. Impaired mobility and ADLs Z74.09 799.89   6. Impaired functional mobility, balance, gait, and endurance Z74.09 V49.89     Patient Active Problem List   Diagnosis   • Acute on chronic renal failure   • History of breast cancer   • Non Hodgkin's lymphoma   • Generalized weakness   • Thrombocytopenia   • Anemia   • Encephalopathy, metabolic   • Hyperkalemia     Past Medical History:   Diagnosis Date   • Breast cancer    • Dysphagia 2016   • Esotropia 2016   • Hyperlipidemia 2016   • Hypertension    • Non Hodgkin's lymphoma    • Polyarthritis 2016   • Seborrheic keratosis 2016   • Secondary malignant neoplasm 10/15/2015     Past Surgical History:   Procedure Laterality Date   • APPENDECTOMY     • CATARACT EXTRACTION  2013   • HYSTERECTOMY     • MASTECTOMY     • TONSILLECTOMY AND ADENOIDECTOMY            SWALLOW EVALUATION (last 72 hours)      Swallow Evaluation       17 1315                Rehab Evaluation    Document Type evaluation  -LS        Subjective Information no complaints;agree to therapy  -LS        General Information    Patient Profile Review yes  -LS        Onset of Illness/Injury 17  -LS        Subjective Patient Observations alert, delayed response time, slow speech rate  -LS        Pertinent History Of Current Problem Admit w/ chronic renal failure. RN reports A-fib and RVR. Concerns for coughing with liquids  -LS        Current Diet Limitations other (see comments)    clear liquid diet  -LS        Precautions/Limitations, Vision WFL  -LS        Precautions/Limitations, Hearing WFL  -LS        Prior Level of Function- Communication other (comment)   baseline unknown, delayed response time.  -LS        Prior Level of Function- Swallowing no diet consistency restrictions;other (comment)   pt reports foods don't taste anymore, and GERD  -LS        Plans/Goals Discussed With patient and family;agreed upon  -        Barriers to Rehab none identified  -        Clinical Impression    Patient's Goals For Discharge patient did not state  -LS        Family Goals For Discharge family did not state  -        SLP Swallowing Diagnosis --   r/o pharyngeal dysphagia  -LS        FCM, Swallowing 6-->Level 6  -LS        Therapy Frequency PRN  -        SLP Diet Recommendation regular textures;thin liquids   when medically able to upgrade  -        Recommended Diagnostics VFSS (MBS)   to be completed 3/28/17  -        Recommended Feeding/Eating Techniques small sips/bites  -        SLP Rec. for Method of Medication Administration meds whole with thin liquid;meds whole in pudding/applesauce  -        Oral Motor Structure and Function    Oral Motor Anatomy and Physiology patient demonstrates anatomy and physiology that is WNL  -        Dentition Assessment present and adequate  -LS        Secretion Management WNL/WFL  -LS        Mucosal Quality moist, healthy  -LS        Volitional Swallow no difficulties initiating volitional swallow  -        Clinical Swallow Exam    Mode of Presentation self fed;cup;spoon;straw  -LS        Oral Phase Results intact oral phase without signs of dysfunction  -LS        Pharyngeal Phase Results sign/symptoms of pharyngeal impairment   wet vocal quality  -LS        Summary of Clinical Exam R/o pharyngeal dysphagia. Intermittant wet vocal quality with thins. No throat clear or cough even when pushed with PO. Pt with delayed response time. REC: regular  and thins when medically ready, meds whole in thins or pureed. MBS to r/o silent asp on 3/28/17  -          User Key  (r) = Recorded By, (t) = Taken By, (c) = Cosigned By    Initials Name Effective Dates    MARÍA Bustillo MS CCC-SLP 06/22/15 -         EDUCATION  The patient has been educated in the following areas:   Dysphagia (Swallowing Impairment) Oral Care/Hydration.    SLP Recommendation and Plan  SLP Swallowing Diagnosis:  (r/o pharyngeal dysphagia)  SLP Diet Recommendation: regular textures, thin liquids (when medically able to upgrade)  Recommended Feeding/Eating Techniques: small sips/bites  SLP Rec. for Method of Medication Administration: meds whole with thin liquid, meds whole in pudding/applesauce     Recommended Diagnostics: VFSS ([facility] [place] [place]) (to be completed 3/28/17)           Therapy Frequency: PRN             Plan of Care Review  Plan Of Care Reviewed With: patient  Progress: progress towards functional goals is fair  Outcome Summary/Follow up Plan: Clinical Swallow Eval. R/o pharyngeal dysphagia. Intermittant wet vocal quality with thins. No throat clear or cough even when pushed with PO. Pt with delayed response time. REC: regular and thins when medically ready, meds whole in thins or pureed. MBS to r/o silent asp on 3/28/17             Time Calculation:         Time Calculation- SLP       03/27/17 1423          Time Calculation- SLP    SLP Start Time 1315  -      SLP Received On 03/27/17  -        User Key  (r) = Recorded By, (t) = Taken By, (c) = Cosigned By    Initials Name Provider Type    MARÍA Bustillo MS CCC-SLP Speech and Language Pathologist          Therapy Charges for Today     Code Description Service Date Service Provider Modifiers Qty    [id number] HC ST EVAL ORAL PHARYNG SWALLOW 4 3/27/2017 Adelaide Bustillo MS CCC-SLP GN 1               Adelaide Bustillo MS CCC-SLP  3/27/2017

## 2017-03-27 NOTE — PROGRESS NOTES
Discharge Planning Assessment  [facility]     Patient Name: Taina Marinelli  MRN: [id number]  Today's Date: 3/27/2017    Admit Date: 3/25/2017          Discharge Needs Assessment       03/27/17 1009    Living Environment    Lives With alone    Living Arrangements house    Provides Primary Care For no one    Quality Of Family Relationships supportive;helpful;involved    Able to Return to Prior Living Arrangements yes    Discharge Needs Assessment    Concerns To Be Addressed home safety concerns    Readmission Within The Last 30 Days no previous admission in last 30 days    Anticipated Changes Related to Illness none    Equipment Currently Used at Home raised toilet;grab bar;shower chair    Equipment Needed After Discharge walker, rolling   Deferred to Rehab    Transportation Available car;family or friend will provide    Discharge Disposition rehab facility    Discharge Contact Information if Applicable Chai Marinelli, son  [phone]  Shivani Marinelli, daughter  [phone]            Discharge Plan       03/27/17 1011    Case Management/Social Work Plan    Plan Rehab    Patient/Family In Agreement With Plan yes    Additional Comments Spoke with patient at bedside regarding discharge planning.  Patient denies use of Home Health and has a Raised Toilet and Grab Bars at home.  Denies difficulty affording medications.  Patient lives alone in a single level home.  Patient reports that recently even before she began feeling bad and came to the hospital she could have used a Rolling Walker at home.   Daughter from [place] at bedside denies that she needs a walker.  RW deferred to rehab facility.   Patient [facility] re[facility].  Spoke to Amena with [facility] who accepted the referral for evaluation.  Awaiting callback.  CM will continue to follow for discharge needs.  Patient plans to discharge to rehab via car with family to transport when medically ready.        Discharge Placement     No  information found        Expected Discharge Date and Time     Expected Discharge Date Expected Discharge Time    Mar 30, 2017               Demographic Summary       03/27/17 1007    Referral Information    Admission Type inpatient    Referral Source admission list    Reason For Consult discharge planning    Record Reviewed clinical discipline documentation;history and physical;patient profile    Primary Care Physician Information    Name Asia Masters MD            Functional Status       03/27/17 1007    Functional Status Current    Current Functional Level Comment Per Nursing Assessment    Functional Status Prior    Ambulation 0-->independent    Transferring 0-->independent    Toileting 0-->independent    Bathing 0-->independent    Dressing 0-->independent    Eating 0-->independent    Communication 0-->understands/communicates without difficulty    Swallowing 0-->swallows foods/liquids without difficulty    IADL    Medications independent    Meal Preparation independent    Housekeeping independent    Laundry independent    Shopping independent    Oral Care independent    Activity Tolerance    Current Activity Limitations none    Usual Activity Tolerance moderate    Current Activity Tolerance fair    Employment/Financial    Employment/Finance Comments [employer] Medicare Replacement            Psychosocial     None            Abuse/Neglect     None            Legal     None            Substance Abuse     None            Patient Forms     None          Ivette Darden RN

## 2017-03-27 NOTE — PROGRESS NOTES
Continued Stay Note  [facility]     Patient Name: Taina Marinelli  MRN: [id number]  Today's Date: 3/27/2017    Admit Date: 3/25/2017          Discharge Plan       03/27/17 1011    Case Management/Social Work Plan    Plan Rehab    Patient/Family In Agreement With Plan yes    Additional Comments Spoke with patient at bedside regarding discharge planning.  Patient denies use of Home Health and has a Raised Toilet and Grab Bars at home.  Denies difficulty affording medications.  Patient lives alone in a single level home.  Patient reports that recently even before she began feeling bad and came to the hospital she could have used a Rolling Walker at home.   Daughter from [place] at bedside denies that she needs a walker.  RW deferred to rehab facility.   Patient requests rehab at [facility].  Spoke to Amena with [facility] who accepted the referral for evaluation.  Awaiting callback.  CM will continue to follow for discharge needs.  Patient plans to discharge to rehab via car with family to transport when medically ready.              Discharge Codes     None        Expected Discharge Date and Time     Expected Discharge Date Expected Discharge Time    Mar 30, 2017             Ivette Darden RN

## 2017-03-27 NOTE — PLAN OF CARE
Problem: Patient Care Overview (Adult)  Goal: Plan of Care Review  Outcome: Ongoing (interventions implemented as appropriate)    03/27/17 1000   Coping/Psychosocial Response Interventions   Plan Of Care Reviewed With patient   Patient Care Overview   Progress progress toward functional goals as expected   Outcome Evaluation   Outcome Summary/Follow up Plan OT jonas completed this date. Pt presents with impaired strength, balance, and activity tolerance limiting functional independence. Mildly confused with delayed processing contributing to decreased safety awareness. Would benefit from inpatient rehab prior to D/C home.         Problem: Inpatient Occupational Therapy  Goal: Bed Mobility Goal LTG- OT  Outcome: Ongoing (interventions implemented as appropriate)    03/27/17 1000   Bed Mobility OT LTG   Bed Mobility OT LTG, Date Established 03/27/17   Bed Mobility OT LTG, Time to Achieve by discharge   Bed Mobility OT LTG, Activity Type supine to sit/sit to supine   Bed Mobility OT LTG, [place] Level contact guard assist;verbal cues required       Goal: Transfer Training Goal 1 LTG- OT  Outcome: Ongoing (interventions implemented as appropriate)    03/27/17 1000   Transfer Training OT LTG   Transfer Training OT LTG, Date Established 03/27/17   Transfer Training OT LTG, Time to Achieve by discharge   Transfer Training OT LTG, Activity Type toilet;sit to stand/stand to sit   Transfer Training OT LTG, [place] Level minimum assist (75% patient effort);verbal cues required   Transfer Training OT LTG, Assist Device walker, rolling;commode, bedside       Goal: LB Dressing Goal LTG- OT  Outcome: Ongoing (interventions implemented as appropriate)    03/27/17 1000   LB Dressing OT LTG   LB Dressing Goal OT LTG, Date Established 03/27/17   LB Dressing Goal OT LTG, Time to Achieve by discharge   LB Dressing Goal OT LTG, Activity Type demo LB dressing task    LB Dressing Goal OT LTG, [place] Level contact guard  assist;verbal cues required   LB Dressing Goal OT LTG, Adaptive Equipment (with AE PRN )       Goal: Activity Tolerance Goal LTG- OT  Outcome: Ongoing (interventions implemented as appropriate)    03/27/17 1000   Activity Tolerance OT LTG   Activity Tolerance Goal OT LTG, Date Established 03/27/17   Activity Tolerance Goal OT LTG, Time to Achieve by discharge   Activity Tolerance Goal OT LTG, Activity Level 10 min activity   Activity Tolerance Goal OT LTG, Additional Goal standing activity with 1 sitting rest break

## 2017-03-27 NOTE — PLAN OF CARE
Problem: Fall Risk (Adult)  Goal: Identify Related Risk Factors and Signs and Symptoms  Outcome: Ongoing (interventions implemented as appropriate)    03/27/17 0444   Fall Risk   Fall Risk: Related Risk Factors history of falls;impaired vision;age-related changes;bladder function altered;confusion/agitation;sleep pattern alteration;environment unfamiliar   Fall Risk: Signs and Symptoms presence of risk factors         Problem: Skin Integrity Impairment, Risk/Actual (Adult)  Goal: Identify Related Risk Factors and Signs and Symptoms  Outcome: Ongoing (interventions implemented as appropriate)    03/27/17 0444   Skin Integrity Impairment, Risk/Actual   Skin Integrity Impairment, Risk/Actual: Related Risk Factors age extremes;medication effects;moisture;metabolic imbalance;skin disorders;tissue perfusion impaired;treatment effects;fluid/nutrition status   Signs and Symptoms (Skin Integrity Impairment) inflammation;plaques/scales;necrotic tissue

## 2017-03-27 NOTE — PLAN OF CARE
Problem: Patient Care Overview (Adult)  Goal: Plan of Care Review  Outcome: Ongoing (interventions implemented as appropriate)    03/27/17 0945   Coping/Psychosocial Response Interventions   Plan Of Care Reviewed With patient;family   Outcome Evaluation   Outcome Summary/Follow up Plan PT initial eval completed. Pt demo significant weakness and fatigue with all mobility, she is also confused and requires constant cueing for safety awareness. She would benefit from cont IPPT and inpatient rehab upon dc from hospital to improve strength and safety with mobility.          Problem: Inpatient Physical Therapy  Goal: Bed Mobility Goal LTG- PT    03/27/17 0945   Bed Mobility PT LTG   Bed Mobility PT LTG, Date Established 03/27/17   Bed Mobility PT LTG, Time to Achieve by discharge   Bed Mobility PT LTG, Activity Type all bed mobility   Bed Mobility PT LTG, [place] Level contact guard assist       Goal: Transfer Training Goal 1 LTG- PT    03/27/17 0945   Transfer Training PT LTG   Transfer Training PT LTG, Date Established 03/27/17   Transfer Training PT LTG, Time to Achieve by discharge   Transfer Training PT LTG, Activity Type all transfers   Transfer Training PT LTG, [place] Level contact guard assist   Transfer Training PT LTG, Assist Device walker, rolling       Goal: Gait Training Goal LTG- PT    03/27/17 0945   Gait Training PT LTG   Gait Training Goal PT LTG, Date Established 03/27/17   Gait Training Goal PT LTG, Time to Achieve by discharge   Gait Training Goal PT LTG, [place] Level contact guard assist   Gait Training Goal PT LTG, Assist Device walker, rolling   Gait Training Goal PT LTG, Distance to Achieve 100

## 2017-03-27 NOTE — NURSING NOTE
Contacted Ana Maria GARG to report elevated temp of 103.6 oral and receive new orders for straight cath due to patients inability to void. See new orders.

## 2017-03-28 ENCOUNTER — APPOINTMENT (OUTPATIENT)
Dept: GENERAL RADIOLOGY | Facility: HOSPITAL | Age: 82
End: 2017-03-28

## 2017-03-28 NOTE — SIGNIFICANT NOTE
Exam confirms with auscultation zero audible heart tones and zero audible respirations. Ms.Jocelyn Marinelli was pronounced dead at 2043 on 3-.  MD notified by Patient's RN.    Sabrina Chatman RN  Clinical House Supervisor  3/27/2017 10:54 PM

## 2017-03-28 NOTE — DISCHARGE SUMMARY
[facility] Medicine Services  DISCHARGE/DEATH SUMMARY       Date of Admission: 3/25/2017  Date of Discharge:  3/27/2017  Primary Care Physician: Asia Masters MD    Discharge Diagnoses:  Active Hospital Problems (** Indicates Principal Problem)    Diagnosis Date Noted   • **Acute on chronic renal failure [N17.9, N18.9] 03/25/2017   • Atrial fibrillation with RVR, new onset [I48.91] 03/27/2017   • Generalized weakness [R53.1] 03/26/2017   • Thrombocytopenia [D69.6] 03/26/2017   • Anemia [D64.9] 03/26/2017   • Encephalopathy, metabolic [G93.41] 03/26/2017   • Hyperkalemia [E87.5] 03/26/2017   • History of breast cancer [C50.919] 03/25/2017   • Non Hodgkin's lymphoma [C85.90] 03/25/2017      Resolved Hospital Problems    Diagnosis Date Noted Date Resolved   No resolved problems to display.       Presenting Problem/History of Present Illness  Acute on chronic renal failure [N17.9, N18.9]     Hospital Course  Patient is a 87 y.o. female with history of breast cancer, currently undergoing workup for lymphoma, as well as investigation by GI for inflammation around the ileum at [facility] with Dr. Arnold and Dr. Yu. Per documentation, patient had been c/o 2-3 weeks of generalized weakness/fatigue and had decline functionally to the point where she can barely get out of bed. She presented to the Ed and was found to have IRVING. She was admitted for further evaluation. She had been relatively stable until the morning of 3/27/2017. Her renal function/ creatinine had been relatively unchanged, though we do not know what her baseline was. Request for her medical records from [facility] had been made, but we did not get it prior to her death. On morning on 3/27/2017, patient's monitor sounded the alarm. It was noted that she was very tachycardic and consistent with atrial fibrillation with RVR. EKG was obtained and confirmed it. Basic lab/workup were initiated. Cardiology  was consulted after she was started on Cardizem gtt. Troponin came back WNL. As the day went on, patient clinically deteriorate and had difficulties with breathing/SOA. She eventually passed away on 3/27/2017 at 2043 per documentation. I was not present at the time of death.    Procedures Performed  None     Physical Exam  N/A. I was not present.    Discharge Disposition       Margarita De Dios MD 17 3:45 PM    Time: Discharge 30 min    Please note that portions of this note may have been completed with a voice recognition program. Efforts were made to edit the dictations, but occasionally words are mistranscribed.

## 2017-03-29 NOTE — THERAPY DISCHARGE NOTE
Acute Care - Physical Therapy Discharge Summary   [place]       Patient Name: Taina Marinelli  : 3/10/1930  MRN: [id number]    Today's Date: 3/29/2017  Onset of Illness/Injury or Date of Surgery Date: 17    Date of Referral to PT: 17  Referring Physician: MD Amor       Admit Date: 3/25/2017      PT Recommendation and Plan    Visit Dx:    ICD-10-CM ICD-9-CM   1. Dehydration, moderate E86.0 276.51   2. Acute on chronic renal failure N17.9 584.9    N18.9 585.9   3. Dizziness R42 780.4   4. Weakness R53.1 780.79   5. Impaired mobility and ADLs Z74.09 799.89   6. Impaired functional mobility, balance, gait, and endurance Z74.09 V49.89             Outcome Measures       17 0821 17 0820       How much help from another person do you currently need...    Turning from your back to your side while in flat bed without using bedrails?  2  -SR     Moving from lying on back to sitting on the side of a flat bed without bedrails?  2  -SR     Moving to and from a bed to a chair (including a wheelchair)?  2  -SR     Standing up from a chair using your arms (e.g., wheelchair, bedside chair)?  2  -SR     Climbing 3-5 steps with a railing?  1  -SR     To walk in hospital room?  2  -SR     AM-PAC 6 Clicks Score  11  -SR     How much help from another is currently needed...    Putting on and taking off regular lower body clothing? 2  -EL      Bathing (including washing, rinsing, and drying) 2  -EL      Toileting (which includes using toilet bed pan or urinal) 1  -EL      Putting on and taking off regular upper body clothing 2  -EL      Taking care of personal grooming (such as brushing teeth) 3  -EL      Eating meals 3  -EL      Score 13  -EL      Functional Assessment    Outcome Measure Options AM-PAC 6 Clicks Daily Activity (OT)  -EL AM-PAC 6 Clicks Basic Mobility (PT)  -SR       User Key  (r) = Recorded By, (t) = Taken By, (c) = Cosigned By    Initials Name Provider Type    SR Radha Painting, PT  Physical Therapist    LINNEA Ford, OT Occupational Therapist                      IP PT Goals       17 0945          Bed Mobility PT LTG    Bed Mobility PT LTG, Date Established 17  -SR      Bed Mobility PT LTG, Time to Achieve by discharge  -SR      Bed Mobility PT LTG, Activity Type all bed mobility  -SR      Bed Mobility PT LTG, [place] Level contact guard assist  -SR      Transfer Training PT LTG    Transfer Training PT LTG, Date Established 17  -SR      Transfer Training PT LTG, Time to Achieve by discharge  -SR      Transfer Training PT LTG, Activity Type all transfers  -SR      Transfer Training PT LTG, [place] Level contact guard assist  -SR      Transfer Training PT LTG, Assist Device walker, rolling  -SR      Gait Training PT LTG    Gait Training Goal PT LTG, Date Established 17  -SR      Gait Training Goal PT LTG, Time to Achieve by discharge  -SR      Gait Training Goal PT LTG, [place] Level contact guard assist  -SR      Gait Training Goal PT LTG, Assist Device walker, rolling  -SR      Gait Training Goal PT LTG, Distance to Achieve 100  -SR        User Key  (r) = Recorded By, (t) = Taken By, (c) = Cosigned By    Initials Name Provider Type    SR Radha Painting, PT Physical Therapist              PT Discharge Summary  Reason for Discharge: Patient   Outcomes Achieved: Refer to plan of care for updates on goals achieved  Discharge Destination: other (comment) ()      Amanda Guadarrama, PT   3/29/2017

## 2017-03-29 NOTE — THERAPY DISCHARGE NOTE
Acute Care - Occupational Therapy Discharge Summary   [place]     Patient Name: Taina Marinelli  : 3/10/1930  MRN: [id number]    Today's Date: 3/29/2017  Onset of Illness/Injury or Date of Surgery Date: 17    Date of Referral to OT: 17  Referring Physician: MD Amor       Admit Date: 3/25/2017        OT Recommendation and Plan    Visit Dx:    ICD-10-CM ICD-9-CM   1. Dehydration, moderate E86.0 276.51   2. Acute on chronic renal failure N17.9 584.9    N18.9 585.9   3. Dizziness R42 780.4   4. Weakness R53.1 780.79   5. Impaired mobility and ADLs Z74.09 799.89   6. Impaired functional mobility, balance, gait, and endurance Z74.09 V49.89                     OT Goals       17 1000          Bed Mobility OT LTG    Bed Mobility OT LTG, Date Established 17  -EL      Bed Mobility OT LTG, Time to Achieve by discharge  -EL      Bed Mobility OT LTG, Activity Type supine to sit/sit to supine  -EL      Bed Mobility OT LTG, [place] Level contact guard assist;verbal cues required  -EL      Transfer Training OT LTG    Transfer Training OT LTG, Date Established 17  -EL      Transfer Training OT LTG, Time to Achieve by discharge  -EL      Transfer Training OT LTG, Activity Type toilet;sit to stand/stand to sit  -EL      Transfer Training OT LTG, [place] Level minimum assist (75% patient effort);verbal cues required  -EL      Transfer Training OT LTG, Assist Device walker, rolling;commode, bedside  -EL      LB Dressing OT LTG    LB Dressing Goal OT LTG, Date Established 17  -EL      LB Dressing Goal OT LTG, Time to Achieve by discharge  -EL      LB Dressing Goal OT LTG, Activity Type demo LB dressing task   -EL      LB Dressing Goal OT LTG, [place]elle Level contact guard assist;verbal cues required  -EL      LB Dressing Goal OT LTG, Adaptive Equipment --   with AE PRN   -EL      Activity Tolerance OT LTG    Activity Tolerance Goal OT LTG, Date Established 17  -EL       Activity Tolerance Goal OT LTG, Time to Achieve by discharge  -EL      Activity Tolerance Goal OT LTG, Activity Level 10 min activity  -EL      Activity Tolerance Goal OT LTG, Additional Goal standing activity with 1 sitting rest break   -EL        User Key  (r) = Recorded By, (t) = Taken By, (c) = Cosigned By    Initials Name Provider Type    EL Ofelia Ford, OT Occupational Therapist                Outcome Measures       17 0821 17 0820       How much help from another person do you currently need...    Turning from your back to your side while in flat bed without using bedrails?  2  -SR     Moving from lying on back to sitting on the side of a flat bed without bedrails?  2  -SR     Moving to and from a bed to a chair (including a wheelchair)?  2  -SR     Standing up from a chair using your arms (e.g., wheelchair, bedside chair)?  2  -SR     Climbing 3-5 steps with a railing?  1  -SR     To walk in hospital room?  2  -SR     AM-PAC 6 Clicks Score  11  -SR     How much help from another is currently needed...    Putting on and taking off regular lower body clothing? 2  -EL      Bathing (including washing, rinsing, and drying) 2  -EL      Toileting (which includes using toilet bed pan or urinal) 1  -EL      Putting on and taking off regular upper body clothing 2  -EL      Taking care of personal grooming (such as brushing teeth) 3  -EL      Eating meals 3  -EL      Score 13  -EL      Functional Assessment    Outcome Measure Options AM-PAC 6 Clicks Daily Activity (OT)  -EL AM-PAC 6 Clicks Basic Mobility (PT)  -SR       User Key  (r) = Recorded By, (t) = Taken By, (c) = Cosigned By    Initials Name Provider Type    SR Radha Painting, PT Physical Therapist    LINNEA Ford, OT Occupational Therapist              OT Discharge Summary  Reason for Discharge: Patient   Outcomes Achieved: Refer to plan of care for updates on goals achieved  Discharge Destination: other (comment) (pt  )      Shivani Joyce, OT  3/29/2017

## 2017-04-01 LAB
BACTERIA SPEC AEROBE CULT: NORMAL
BACTERIA SPEC AEROBE CULT: NORMAL